# Patient Record
Sex: FEMALE | Race: WHITE
[De-identification: names, ages, dates, MRNs, and addresses within clinical notes are randomized per-mention and may not be internally consistent; named-entity substitution may affect disease eponyms.]

---

## 2019-03-21 ENCOUNTER — HOSPITAL ENCOUNTER (EMERGENCY)
Dept: HOSPITAL 88 - ER | Age: 84
Discharge: ANOTHER HOSPITAL | End: 2019-03-21
Payer: MEDICARE

## 2019-03-21 DIAGNOSIS — R09.89: Primary | ICD-10-CM

## 2019-03-21 NOTE — XMS REPORT
Summary of Care: 10/19/15 - 10/19/15

                             Created on: 2032



GIOBLANCA

External Reference #: [id number]

: 1931

Sex: Female



Demographics







                          Address                   [facility] [place]  [address]-

 

                          Home Phone                [phone]

 

                          Preferred Language        English

 

                          Marital Status            Single

 

                          [employer] Affiliation     [employer]

 

                          Race                      Other

 

                          Ethnic Group              Non-





Author







                          Author                    [facility]

 

                          Organization              [facility]

 

                          Address                   Unknown

 

                          Phone                     Unavailable







Encounter





HQ Gwen(FIN) [id number] Date(s): 10/19/15 - 10/19/15

[facility] [address] [place] [phone]
[phone]

Discharge Disposition: Home

Attending Physician: Lalo Nagy DO





Vital Signs





No data available for this section



Problem List







    



              Condition     Effective Dates     Status       Health Status     Informant

 

    



                           A-fib(Confirmed)          Resolved  

 

    



                           Anxiety(Confirmed)        Resolved  

 

    



                           Cardiac                   Resolved  



                                         pacemaker(Confirmed)    

 

    



                           CHF - Congestive          Resolved  



                                         heart    



                                         failure(Confirmed)    

 

    



                           Closed hip                Resolved  



                                         fracture(Confirmed)1    

 

    



                           CVA (cerebral             Resolved  



                                         vascular    



                                         accident)(Confirmed)    

 

    



                           Dementia(Confirmed)       Resolved  

 

    



                           Parkinson's               Resolved  



                                         disease(Confirmed)    







1bilateral



Allergies, Adverse Reactions, Alerts







   



                 Substance       Reaction        Severity        Status

 

   



                           morphine1                 Active







1Pt had a generalized rash on 10/7



Medications





No data available for this section



Results





No data available for this section



Immunizations







  



                     Vaccine             Date                Refusal Reason

 

  



                     influenza virus vaccine, inactivated     10/8/15             Patient Refuses

 

  



                     pneumococcal 23-valent vaccine     10/8/15             Patient Refuses







Procedures





No data available for this section



Social History







 



                           Social History Type       Response

 

 



                           Smoking Status            Never smoker; Exposure to Tobacco Smoke None; Cigarette Smoking

 Last 365



                                         Days No; Reg Smoking Cessation Counseling No







Assessment and Plan





No data available for this section

## 2019-03-21 NOTE — XMS REPORT
Summary of Care: 10/7/15 - 10/12/15

                             Created on: 2032



BLANCA BERNARDO

External Reference #: [id number]

: 1931

Sex: Female



Demographics







                          Address                   [address] [place]  [address]-

 

                          Home Phone                [phone]

 

                          Preferred Language        English

 

                          Marital Status            

 

                          Congregational Affiliation     Oriental orthodox

 

                          Race                      Other

 

                          Ethnic Group              Non-





Author







                          Author                    [facility]

 

                          Organization              [facility]

 

                          Address                   Unknown

 

                          Phone                     Unavailable







Encounter





WILFREDO Hidalgo(MANE) [id number] Date(s): 10/7/15 - 10/12/15

[facility] [address] [facility] provided by         
  [employer]       [place] [place] [address]-    
[phone]

Discharge Disposition: Skilled Nursing Facility

Attending Physician: Eleanor Encarnacion MD

Admitting Physician: Eleanor Encarnacion MD

Referring Physician: Zhao Alarcon MD





Vital Signs







                    1                   2                   3



                                         Most recent to   



                                         oldest [Reference   



                                         Range]:   

 

                                        152 cm 

                          (10/10/15 5:12 AM)        152.4 cm 

                          (10/7/15 3:34 PM)         152.4 cm 

                                        (10/7/15 3:09 PM)



                                         Height   









                    1                   2                   3



                                         Most recent to   



                                         oldest [Reference   



                                         Range]:   

 

                                        45 kg 

                          (10/10/15 5:12 AM)        45.2 kg 

                          (10/8/15 5:55 AM)          



                                         Current Weight   









                    1                   2                   3



                                         Most recent to   



                                         oldest [Reference   



                                         Range]:   

 

                                        98.2 DegF 

                          (10/12/15 11:53 AM)       97.8 DegF 

                          (10/12/15 11:48 AM)       97.6 DegF 

                                        (10/12/15 7:43 AM)



                                         Temperature Oral   



                                         [96.4-99.1 DegF]   









                    1                   2                   3



                                         Most recent to   



                                         oldest [Reference   



                                         Range]:   

 

                                        119/68 mmHg 

                          (10/12/15 11:51 AM)       130/75 mmHg 

                          (10/12/15 11:48 AM)       139/84 mmHg 

                                        (10/12/15 7:43 AM)



                                         Blood Pressure   



                                         [/60-90 mmHg]   









                    1                   2                   3



                                         Most recent to   



                                         oldest [Reference   



                                         Range]:   

 

                                        20 BRMIN 

                          (10/12/15 11:51 AM)       20 BRMIN 

                          (10/12/15 11:48 AM)       20 BRMIN 

                                        (10/12/15 7:43 AM)



                                         Respiratory Rate   



                                         [14-20 BRMIN]   









                    1                   2                   3



                                         Most recent to   



                                         oldest [Reference   



                                         Range]:   

 

                                        70 bpm 

                          (10/12/15 11:48 AM)       70 bpm 

                          (10/12/15 7:43 AM)        70 bpm 

                                        (10/12/15 3:27 AM)



                                         Peripheral Pulse   



                                         Rate [ bpm]   









                    1                   2                   3



                                         Most recent to   



                                         oldest [Reference   



                                         Range]:   

 

                                        43.182 kg 

                          (10/7/15 3:34 PM)         40.909 kg 

                          (10/7/15 3:09 PM)         60 kg 

                                        (10/7/15 10:48 AM)



                                         Weight   









                    1                   2                   3



                                         Most recent to   



                                         oldest [Reference   



                                         Range]:   

 

                                        18.59 m2 

                          (10/7/15 3:34 PM)         17.61 m2 

                          (10/7/15 3:09 PM)          



                                         Body Mass Index   







Problem List







    



              Condition     Effective Dates     Status       Health Status     Informant

 

    



                           A-fib(Confirmed)          Resolved  

 

    



                           Anxiety(Confirmed)        Resolved  

 

    



                           Cardiac                   Resolved  



                                         pacemaker(Confirmed)    

 

    



                           CHF - Congestive          Resolved  



                                         heart    



                                         failure(Confirmed)    

 

    



                           Closed hip                Resolved  



                                         fracture(Confirmed)1    

 

    



                           CVA (cerebral             Resolved  



                                         vascular    



                                         accident)(Confirmed)    

 

    



                           Dementia(Confirmed)       Resolved  

 

    



                           Parkinson's               Resolved  



                                         disease(Confirmed)    







1bilateral



Allergies, Adverse Reactions, Alerts







   



                 Substance       Reaction        Severity        Status

 

   



                           morphine1                 Active







1Pt had a generalized rash on 10/7



Medications





acetaminophen

650 mg, 2 tab, Route: PO, Drug form: TAB, Q6H, Dosing Weight 43.182, kg, Start d
ate: 10/08/15 12:00:00, Duration: 30 day, Stop date: 11/07/15 6:00:00

Notes: Do not exceed 4 gm/day.  (Same as: Tylenol)

Start Date: 10/8/15

Stop Date: 10/11/15

Status: Discontinued



Aldactone 25 mg oral tablet

25 mg=1 tab, PO, PRN, # 60 tab, 0 Refill(s)

Start Date: 10/7/15

Status: Ordered



ALPRAZOLam 0.25 mg oral tablet, disintegrating

0.25 mg=1 tab, PO, Q12H, PRN Anxiety, 0 Refill(s)

Start Date: 10/7/15

Status: Ordered



Benadryl

12.5 mg, 0.25 mL, Route: IV, Drug form: INJ, ONCE, Dosing Weight 60, kg, Start d
ate: 10/07/15 14:46:00, Stop date: 10/07/15 14:46:00

Notes: (Same as: Benadryl)

Start Date: 10/7/15

Stop Date: 10/7/15

Status: Completed



Benadryl

12.5 mg, 5 mL, Route: PO, Drug form: LIQ, TID, Dosing Weight 43.182, kg, PRN Itc
damon, Start date: 10/08/15 1:48:00, Duration: 30 day, Stop date: 11/07/15 1:47:0
0

Notes: (Same as: Benadryl)

Start Date: 10/8/15

Stop Date: 10/12/15

Status: Discontinued



carbidopa-levodopa 25 mg-100 mg oral tablet

1 tab, PO, QID, 0 Refill(s)

Start Date: 10/12/15

Status: Ordered



carbidopa-levodopa 25 mg-100 mg oral tablet

1 tab, Route: PO, Drug Form: TAB, Dosing Weight 60, kg, QID, Start date: 10/07/1
5 17:00:00, Duration: 30 day, Stop date: 11/06/15 13:00:00

Notes: Take with milk or food. (Same As: Sinemet)

Start Date: 10/7/15

Stop Date: 10/12/15

Status: Discontinued



carbidopa-levodopa 25 mg-100 mg oral tablet

1 tab, PO, QID, # 120 tab, 0 Refill(s)

Start Date: 10/7/15

Status: Ordered



Coreg 12.5 mg oral tablet

12.5 mg=1 tab, PO, BID, # 60 tab, 0 Refill(s)

Start Date: 10/12/15

Status: Ordered



Dextrose 50% Syringe

12.5 gm, 25 mL, Route: IVP, Drug Form: INJ, Dosing Weight 43.182, kg, PRN, PRN A
bnormal Lab Result, Start date: 10/09/15 3:58:00, Duration: 30 day, Stop date: 1
1/08/15 2:57:00, For [facility] 40 mg/dL - 60 mg/dL

Special Instructions: For [facility] 40 mg/dL - 60 mg/dL

Start Date: 10/9/15

Stop Date: 10/9/15

Status: Discontinued



Dextrose 50% Syringe

25 gm, 50 mL, Route: IVP, Drug Form: INJ, Dosing Weight 43.182, kg, PRN, PRN Abn
ormal Lab Result, Start date: 10/09/15 3:58:00, Duration: 30 day, Stop date: 11/
08/15 2:57:00, For [facility] < 40 mg/dL

Special Instructions: For [facility] < 40 mg/dL

Start Date: 10/9/15

Stop Date: 10/9/15

Status: Discontinued



docusate

100 mg, 1 cap, Route: NG, Drug form: CAP, Q12H, Dosing Weight 60, kg, Start date
: 10/07/15 21:00:00, Stop date: 11/06/15 9:00:00

Notes: (Same as: Colace) (Do Not Crush)

Start Date: 10/7/15

Stop Date: 10/12/15

Status: Discontinued



Enoxaparin 20 mg (0.2 mL)

Enoxaparin 20 mg (0.2 mL), 20 mg, 0.2 mL, Drug form: MISC, Route: SUB-Q, enoxQ12
H, 10/09/15 12:30:00, Duration: 30 day, Stop date: 11/08/15 0:30:00

Notes: PLEASE NOTE: Using enoxaparin 30 mg syringe, draw up 0.2 mL and put in in
jection syringe under IV de leon in sterile conditions.

Start Date: 10/9/15

Stop Date: 10/11/15

Status: Discontinued



Factor 2-7-9-10 Prothrombin Complex Concentrate 2,208 unit + IV bag 1 ea

Route: IV, Drug form: INJ, ONCE, Dosing Weight 60, kg, ; Use 100kg max dosing we
ight for pt >100kg. Max. cumulative dose=50 units/kg/day., Priority: STAT, Start
date: 10/07/15 11:16:00, Stop date: 10/07/15 11:16:00

Notes: Same as: Kcentra  Maximum dose=5000 units; Round down dose to the nearest
vial size  Hematology clinical pharmacist consult required

Start Date: 10/7/15

Stop Date: 10/7/15

Status: Completed



Factor 2-7-9-10 Prothrombin Complex Concentrate 2,208 unit + IV bag 1 ea

Route: IV, Drug form: INJ, ONCE, Dosing Weight 60, kg, ; Use 100kg max dosing we
ight for pt >100kg. Max. cumulative dose=50 units/kg/day., Priority: STAT, Start
date: 10/07/15 10:49:00, Stop date: 10/07/15 10:49:00

Notes: Same as: Kcentra  Maximum dose=5000 units; Round down dose to the nearest
vial size  Hematology clinical pharmacist consult required

Start Date: 10/7/15

Stop Date: 10/7/15

Status: Deleted



fosphenytoin

1,200 mg, Route: IVPB, ONCE, Dosing Weight 60, kg, Priority: STAT, Start date: 1
0/07/15 10:51:00, Stop date: 10/07/15 10:51:00

Start Date: 10/7/15

Stop Date: 10/7/15

Status: Completed



heparin

5,000 unit, 1 mL, Route: SUB-Q, Drug form: INJ, Q12H, Start date: 10/08/15 10:30
:00, Duration: 30 day, Stop date: 11/07/15 9:00:00

Notes: porcine heparin

Start Date: 10/8/15

Stop Date: 10/8/15

Status: Canceled



heparin 5000 units/mL injectable solution

5000, SUB-Q, Q12H, # 1 IntlUnit, 0 Refill(s)

Start Date: 10/12/15

Status: Ordered



heparin 5000 units/mL injectable solution

5,000 unit, 1 mL, Route: SUB-Q, Drug form: INJ, Q12H, Dosing Weight 43.182, kg, 
Start date: 10/11/15 12:00:00, Duration: 30 day, Stop date: 11/10/15 0:00:00

Notes: porcine heparin

Start Date: 10/11/15

Stop Date: 10/12/15

Status: Discontinued



influenza virus vaccine, inactivated

0.5 mL, Route: IM, Drug Form: SUSP, Daily, Start date: 10/08/15 9:00:00, Duratio
n: 1 doses or times, Stop date: 10/08/15 9:00:00

Notes: (Same as: Fluzone Quadrivalent)For 3 years of age and older (0.5 mL IM)Sh
tesfaye well before use

Start Date: 10/8/15

Stop Date: 10/8/15

Status: Completed



Insulin regular

12 unit, 0.12 mL, Route: SUB-Q, Drug form: SOLN, PRN, Dosing Weight 43.182, kg, 
PRN Abnormal Lab Result, Start date: 10/09/15 3:58:00, Duration: 30 day, Stop da
te: 11/08/15 2:57:00, For [facility] >=200 mg/dL

Special Instructions: For [facility] >=200 mg/dL

Notes: (Same as: Humulin R) Roll in palms of hands gently;  Do not shake vigorou
sly. "single patient use only"(Restricted to patients requiring a dose >  60 
units)  Stable for 28 days at room temperatureExpires in ______ days from _
_____________Date

Start Date: 10/9/15

Stop Date: 10/9/15

Status: Discontinued



Insulin regular

8 unit, 0.08 mL, Route: SUB-Q, Drug form: SOLN, PRN, Dosing Weight 43.182, kg, P
RN Abnormal Lab Result, Start date: 10/09/15 3:58:00, Duration: 30 day, Stop brandi
e: 11/08/15 2:57:00, For [facility] 175 mg/dL - 199 mg/dL

Special Instructions: For [facility] 175 mg/dL - 199 mg/dL

Notes: (Same as: Humulin R) Roll in palms of hands gently;  Do not shake vigorou
sly. "single patient use only"(Restricted to patients requiring a dose >  60 
units)  Stable for 28 days at room temperatureExpires in ______ days from _
_____________Date

Start Date: 10/9/15

Stop Date: 10/9/15

Status: Discontinued



Insulin regular

5 unit, 0.05 mL, Route: SUB-Q, Drug form: SOLN, PRN, Dosing Weight 43.182, kg, P
RN Abnormal Lab Result, Start date: 10/09/15 3:58:00, Duration: 30 day, Stop brandi
e: 11/08/15 2:57:00, For [facility] 150 mg/dL - 174 mg/dL

Special Instructions: For [facility] 150 mg/dL - 174 mg/dL

Notes: (Same as: Humulin R) Roll in palms of hands gently;  Do not shake vigorou
sly. "single patient use only"(Restricted to patients requiring a dose >  60 
units)  Stable for 28 days at room temperatureExpires in ______ days from _
_____________Date

Start Date: 10/9/15

Stop Date: 10/9/15

Status: Discontinued



Lasix 20 mg oral tablet

20 mg=1 tab, PO, Daily, # 30 tab, 0 Refill(s)

Start Date: 10/7/15

Status: Ordered



Lasix 20 mg oral tablet

20 mg, 1 tab, Route: PO, Drug form: TAB, Daily, Dosing Weight 60, kg, Start date
: 10/08/15 9:00:00, Duration: 30 day, Stop date: 11/06/15 9:00:00

Notes: (Same as: Lasix)  May cause GI upset.  Give with food or milk.

Start Date: 10/8/15

Stop Date: 10/12/15

Status: Discontinued



lisinopril

5 mg, 1 tab, Route: PO, Drug form: TAB, Daily, Dosing Weight 60, kg, Start date:
10/08/15 9:00:00, Duration: 30 day, Stop date: 11/06/15 9:00:00

Notes: (Same as: Prinivil, Zestril)

Start Date: 10/8/15

Stop Date: 10/12/15

Status: Discontinued



lisinopril 5 mg oral tablet

5 mg=1 tab, PO, Daily, # 30 tab, 0 Refill(s)

Start Date: 10/7/15

Stop Date: 10/12/15

Status: Discontinued



Lovenox

20 mg, 0.2 mL, Route: SUB-Q, Drug form: INJ, [id number], Dosing Weight 43.182, kg,
Start date: 10/08/15 10:30:00, Duration: 30 day, Stop date: 11/06/15 22:30:00

Notes: (Same as: Lovenox)

Start Date: 10/8/15

Stop Date: 10/9/15

Status: Discontinued



Lovenox

30 mg, Route: SUB-Q, Drug form: INJ, [id number], Dosing Weight 43.182, kg, Start d
ate: 10/08/15 10:30:00, Duration: 30 day, Stop date: 11/06/15 22:30:00

Start Date: 10/8/15

Stop Date: 10/8/15

Status: Canceled



Lyrica

25 mg, 1 cap, Route: PO, Drug form: CAP, Q8H, Dosing Weight 43.182, kg, Start da
te: 10/08/15 0:00:00, Duration: 30 day, Stop date: 11/06/15 16:00:00

Notes: (Same as: Lyrica)

Start Date: 10/8/15

Stop Date: 10/11/15

Status: Discontinued



magnesium sulfate

2 gm, 50 mL, Route: IVPB, Drug form: INJ, ONCE, Dosing Weight 43.182, kg, Total 
dose=2 gm, Start date: 10/08/15 0:56:00, Duration: 1 doses or times, Stop date: 
10/08/15 0:56:00

Start Date: 10/8/15

Stop Date: 10/8/15

Status: Completed



morphine Sulfate

4 mg, Route: IVP, Drug form: INJ, ONCE, kg, Priority: STAT, Start date: 10/07/15
10:08:00, Stop date: 10/07/15 10:08:00

Start Date: 10/7/15

Stop Date: 10/7/15

Status: Completed



Norco 10/325 oral tablet

1 tab, PO, Q6H, PRN for pain, 0 Refill(s)

Start Date: 10/7/15

Stop Date: 10/12/15

Status: Discontinued



Norco 7.5/325 oral tablet

1 tab, Route: PO, Drug Form: TAB, Dosing Weight 43.182, kg, Q6H, Start date: 10/
11/15 12:00:00, Duration: 30 day, Stop date: 11/10/15 6:00:00

Notes: Same as Norco 325-7.5mg  Do not exceed 4gm/day of acetaminophen.

Start Date: 10/11/15

Stop Date: 10/12/15

Status: Discontinued



NS (Bolus) IV

500 mL, 500 ml/hr, Infuse Over: 1 hr, Route: IV, 500, Drug form: INJ, ONCE, Prio
rity: STAT, Dosing Weight 43.182 kg, Start date: 10/08/15 15:09:00, Duration: 1 
doses or times, Stop date: 10/08/15 15:09:00

Start Date: 10/8/15

Stop Date: 10/8/15

Status: Completed



nystatin topical 100,000 units/g powder

1 appl, TOP, BID, # 15 gm, 0 Refill(s)

Start Date: 10/12/15

Stop Date: 10/16/15

Status: Ordered



nystatin topical 100,000 units/g powder

1 appl, Route: TOP, BID, Drug form: PWDR, Start date: 10/10/15 9:00:00, Duration
: 30 day, Stop date: 11/08/15 17:00:00

Notes: (Same as:Mycostatin, Nilstat)  For external use only.

Start Date: 10/10/15

Stop Date: 10/12/15

Status: Discontinued



Ofirmev

1,000 mg, 100 mL, Route: IV, Drug form: INJ, Q6H, Dosing Weight 43.182, kg, for 
> or=50 kg, Start date: 10/08/15 19:00:00, Duration: 30 day, Stop date: 11/07/15
18:00:00

Notes: Infuse over 15 minutesDo not exceed 4gm/day of acetaminophen  *** MEDICAT
ION WASTE ***Product Size:  1000 mgProduct Wasted:  ___ mg

Start Date: 10/8/15

Stop Date: 10/8/15

Status: Canceled



oxyCODONE 5 mg immediate release

5 mg, 1 tab, Route: PO, Drug form: TAB, Q6H, Dosing Weight 43.182, kg, PRN Pain 
Score 4-6, Start date: 10/08/15 9:40:00, Duration: 30 day, Stop date: 11/07/15 9
:39:00

Notes: (Same as: Roxicodone)

Start Date: 10/8/15

Stop Date: 10/11/15

Status: Discontinued



oxyCODONE 5 mg immediate release

5 mg, 1 tab, Route: PO, Drug form: TAB, Q6H, Dosing Weight 43.182, kg, Start brandi
e: 10/08/15 0:00:00, Duration: 30 day, Stop date: 11/06/15 18:00:00

Notes: (Same as: Roxicodone)

Start Date: 10/8/15

Stop Date: 10/8/15

Status: Discontinued



phenytoin

100 mg, 1 cap, Route: PO, Drug form: ERCAP, Q12H, Dosing Weight 43.182, kg, Star
t date: 10/08/15 21:00:00, Duration: 6 day, Stop date: 10/14/15 9:00:00

Notes: (Same as: Dilantin)  Do not open, crush, or chew.

Start Date: 10/8/15

Stop Date: 10/12/15

Status: Discontinued



phenytoin

100 mg, 2 mL, Route: IV, Drug form: INJ, Q8H, Dosing Weight 60, kg, Start date: 
10/07/15 19:00:00, Duration: 30 day, Stop date: 11/06/15 16:00:00

Notes: (Same as: Dilantin) Do not infuse greater than 50 mg/min.  *** MEDICATION
WASTE ***Product Size:  100 mgProduct Wasted:  ___ mg

Start Date: 10/7/15

Stop Date: 10/8/15

Status: Discontinued



phenytoin 100 mg oral capsule, extended release

100 mg=1 cap, PO, Q12H, PRN Pain Score 6-10, # 30 cap, 0 Refill(s)

Start Date: 10/12/15

Stop Date: 10/25/15

Status: Ordered



PlasmaLyte A PH-7.4 (Bolus) IV

500 mL, 500 ml/hr, Route: IV, Drug Form: INJ, Dosing Weight 43.182, kg, ONCE, St
art date: 10/07/15 20:40:00, Stop date: 10/07/15 20:40:00

Start Date: 10/7/15

Stop Date: 10/7/15

Status: Completed



pneumococcal 23-valent vaccine

0.5 mL, Route: IM, Drug Form: INJ, Daily, Start date: 10/08/15 9:00:00, Duration
: 1 doses or times, Stop date: 10/08/15 9:00:00

Notes: (Same as: Pneumovax 23)  Refrigerate

Start Date: 10/8/15

Stop Date: 10/8/15

Status: Completed



potassium chloride 20 mEq oral tablet, extended release

20 mEq=1 tab, PO, Daily, # 30 tab, 3 Refill(s)

Start Date: 10/7/15

Status: Ordered



pramipexole

0.25 mg, 1 tab, Route: PO, Drug form: TAB, TID, Dosing Weight 60, kg, Start date
: 10/07/15 17:00:00, Duration: 30 day, Stop date: 11/06/15 13:00:00

Notes: (Same as: Mirapex)

Start Date: 10/7/15

Stop Date: 10/12/15

Status: Discontinued



pramipexole 0.25 mg oral tablet

0.25 mg=1 tab, PO, TID, # 90 tab, 0 Refill(s)

Start Date: 10/7/15

Status: Ordered



Senna 187mg

1 tab, Route: PO, Dosing Weight 43.182, kg, BID, Start date: 10/08/15 17:00:00, 
Duration: 30 day, Stop date: 11/07/15 9:00:00

Start Date: 10/8/15

Stop Date: 10/8/15

Status: Discontinued



senna 8.6 mg oral tablet

17.2 mg=2 tab, PO, Q12H, # 12 tab, 0 Refill(s)

Start Date: 10/12/15

Stop Date: 10/18/15

Status: Ordered



Senokot

17.2 mg, 2 tab, Route: PO, Drug form: TAB, Q12H, Start date: 10/08/15 21:00:00, 
Duration: 30 day, Stop date: 11/07/15 9:00:00

Notes: (Same as: Senokot)

Start Date: 10/8/15

Stop Date: 10/12/15

Status: Discontinued



Synthroid

112 microgram, 1 tab, Route: PO, Drug form: TAB, Q630AM, Dosing Weight 60, kg, S
tart date: 10/08/15 6:30:00, Duration: 30 day, Stop date: 11/06/15 6:30:00

Notes: Take 1 hour before or 2 hours after meal; Enteral feeds may interefere wi
th the absorption of this medication.(Same as:Levothroid)

Start Date: 10/8/15

Stop Date: 10/12/15

Status: Discontinued



Synthroid 112 mcg (0.112 mg) oral tablet

112 microgram=1 tab, PO, Daily, # 30 tab, 0 Refill(s)

Start Date: 10/7/15

Status: Ordered



tramadol

50 mg, 1 tab, Route: PO, Drug form: TAB, Q6H, Dosing Weight 43.182, kg, Start da
te: 10/08/15 0:00:00, Duration: 30 day, Stop date: 11/06/15 18:00:00

Notes: Not to exceed 400mg/day. (Same As: Ultram)

Start Date: 10/8/15

Stop Date: 10/12/15

Status: Discontinued



tramadol 100 mg oral tablet, extended release

100 mg=1 tab, PO, Q8H, PRN Pain Score 6-10, # 24 tab, 0 Refill(s)

Start Date: 10/12/15

Status: Ordered



tramadol 100 mg oral tablet, extended release

100 mg=1 tab, PO, ABXQ8H, PRN Pain Score 6-10, # 24 tab, 0 Refill(s)

Start Date: 10/12/15

Stop Date: 10/12/15

Status: Discontinued



Tylenol

650 mg, 2 tab, Route: PO, Drug form: TAB, Q6H, Dosing Weight 43.182, kg, PRN For
Temp > 100.4 F, Start date: 10/07/15 16:27:00, Duration: 30 day, Stop date: 
11/06/15 16:26:00

Notes: Do not exceed 4 gm/day.  (Same as: Tylenol)

Start Date: 10/7/15

Stop Date: 10/7/15

Status: Discontinued



Visipaque 320mg/ml

90 mL, Route: IVP, Drug Form: SOLN, kg, ONCALL, STAT, Start date: 10/07/15 10:03
:00, Duration: 1 doses or times, Dose=2.2ml/kg,  Max dose=100ml -- "To be infuse
d by Radiology Staff ONLY"

Special Instructions: Dose=2.2ml/kg,  Max dose=100ml -- "To be infused by Radiol
ogy Staff ONLY"

Start Date: 10/7/15

Stop Date: 10/7/15

Status: Completed



Xarelto 15 mg oral tablet

15 mg=1 tab, PO, Daily, 3 Refill(s)

Start Date: 10/7/15

Stop Date: 10/12/15

Status: Discontinued



Zofran

4 mg, Route: IVP, Drug form: INJ, ONCE, kg, Priority: STAT, Start date: 10/07/15
10:08:00, Stop date: 10/07/15 10:08:00

Start Date: 10/7/15

Stop Date: 10/7/15

Status: Completed



Results





ELECTROLYTES





                    1                   2                   3



                                         Most recent to   



                                         oldest [Reference   



                                         Range]:   

 

                                        136 mEq/L 

                          (10/9/15 3:44 AM)         143 mEq/L 

                          (10/8/15 12:15 AM)        140 mEq/L 

                                        (10/7/15 8:34 PM) 



                                         Sodium Lvl [135-145   



                                         mEq/L]   

 

                                        3.7 mEq/L 

                          (10/9/15 3:44 AM)         3.7 mEq/L 

                          (10/8/15 12:15 AM)        3.4 mEq/L 

*LOW*

                                        (10/7/15 8:34 PM) 



                                         Potassium Lvl   



                                         [3.5-5.1 mEq/L]   

 

                                        99 mEq/L 

                          (10/9/15 3:44 AM)         104 mEq/L 

                          (10/8/15 12:15 AM)        102 mEq/L 

                                        (10/7/15 8:34 PM) 



                                         Chloride Lvl [   



                                         mEq/L]   

 

                                        29 mEq/L 

                          (10/9/15 3:44 AM)         31 mEq/L 

                          (10/8/15 12:15 AM)        30 mEq/L 

                                        (10/7/15 8:34 PM) 



                                         CO2 [24-32 mEq/L]   

 

                                        11.7 mEq/L 

                          (10/9/15 3:44 AM)         11.7 mEq/L 

                          (10/8/15 12:15 AM)        11.4 mEq/L 

                                        (10/7/15 8:34 PM) 



                                         AGAP [10.0-20.0   



                                         mEq/L]   







CHEM PANEL





                    1                   2                   3



                                         Most recent to   



                                         oldest [Reference   



                                         Range]:   

 

                                        0.9 mg/dL 

                          (10/9/15 3:44 AM)         1.1 mg/dL 

                          (10/8/15 12:15 AM)        1.0 mg/dL 

                                        (10/7/15 8:34 PM) 



                                         Creatinine Lvl   



                                         [0.5-1.4 mg/dL]   

 

                                        59 mL/min/1.73m2 1

*NA*

                          (10/9/15 3:44 AM)         46 mL/min/1.73m2 2

*NA*

                          (10/8/15 12:15 AM)        54 mL/min/1.73m2 3

*NA*

                                        (10/7/15 8:34 PM) 



                                         eGFR   

 

                                        19 mg/dL 

                          (10/9/15 3:44 AM)         14 mg/dL 

                          (10/8/15 12:15 AM)        12 mg/dL 

                                        (10/7/15 8:34 PM) 



                                         BUN [7-22 mg/dL]   

 

                                        99 mg/dL 

                          (10/9/15 3:44 AM)         130 mg/dL 

*HI*

                          (10/8/15 12:15 AM)        141 mg/dL 

*HI*

                                        (10/7/15 8:34 PM) 



                                         Glucose Lvl [70-99   



                                         mg/dL]   

 

                                        8.6 mg/dL 

                          (10/9/15 3:44 AM)         8.8 mg/dL 

                          (10/8/15 12:15 AM)        9.1 mg/dL 

                                        (10/7/15 8:34 PM) 



                                         Calcium Lvl   



                                         [8.5-10.5 mg/dL]   

 

                                        2.2 mg/dL 

*LOW*

                          (10/9/15 3:44 AM)         4.1 mg/dL 

                          (10/8/15 12:15 AM)        3.5 mg/dL 

                                        (10/7/15 8:34 PM) 



                                         Phosphorus [2.5-4.5   



                                         mg/dL]   

 

                                        2.2 mg/dL 

                          (10/9/15 3:44 AM)         1.8 mg/dL 

                          (10/8/15 12:15 AM)        1.6 mg/dL 

*LOW*

                                        (10/7/15 8:34 PM) 



                                         Magnesium Lvl   



                                         [1.8-2.4 mg/dL]   







1Result Comment: The eGFR is calculated using the CKD-EPI formula. In most 
young, healthy individuals the eGFR will be >90 mL/min/1.73m2. The eGFR declines
with age. An eGFR of 60-89 may be normal in some populations, particularly the 
elderly, for whom the CKD-EPI formula has not been extensively validated. Use of
the eGFR is not recommended in the following populations:



Individuals with unstable creatinine concentrations, including pregnant patients
and those with serious co-morbid conditions.



Patients with extremes in muscle mass or diet. 



The data above are obtained from the National Kidney Disease Education Program (
NKDEP) which additionally recommends that when the eGFR is used in patients with
extremes of body mass index for purposes of drug dosing, the eGFR should be mul
tiplied by the estimated BMI.



2Result Comment: The eGFR is calculated using the CKD-EPI formula. In most 
young, healthy individuals the eGFR will be >90 mL/min/1.73m2. The eGFR declines
with age. An eGFR of 60-89 may be normal in some populations, particularly the 
elderly, for whom the CKD-EPI formula has not been extensively validated. Use of
the eGFR is not recommended in the following populations:



Individuals with unstable creatinine concentrations, including pregnant patients
and those with serious co-morbid conditions.



Patients with extremes in muscle mass or diet. 



The data above are obtained from the National Kid[employer] Disease Education Program (
NKDEP) which additionally recommends that when the eGFR is used in patients with
extremes of body mass index for purposes of drug dosing, the eGFR should be mul
tiplied by the estimated BMI.



3Result Comment: The eGFR is calculated using the CKD-EPI formula. In most 
young, healthy individuals the eGFR will be >90 mL/min/1.73m2. The eGFR declines
with age. An eGFR of 60-89 may be normal in some populations, particularly the 
elderly, for whom the CKD-EPI formula has not been extensively validated. Use of
the eGFR is not recommended in the following populations:



Individuals with unstable creatinine concentrations, including pregnant patients
and those with serious co-morbid conditions.



Patients with extremes in muscle mass or diet. 



The data above are obtained from the National Kid[employer] Disease Education Program (
NKDEP) which additionally recommends that when the eGFR is used in patients with
extremes of body mass index for purposes of drug dosing, the eGFR should be mul
tiplied by the estimated BMI.



PARATHYROID PROFILE





                    1                   2                   3



                                         Most recent to   



                                         oldest [Reference   



                                         Range]:   

 

                                        1.08 mMol/L 

                          (10/9/15 3:44 AM)         1.08 mMol/L 

                          (10/8/15 12:15 AM)        1.06 mMol/L 

                                        (10/7/15 9:06 PM) 



                                         Ca Ion WB [1.05-1.25   



                                         mMol/L]   

 

                                        1.12 mMol/L 

                          (10/9/15 3:44 AM)         1.06 mMol/L 

                          (10/8/15 12:15 AM)        1.04 mMol/L 

*LOW*

                                        (10/7/15 9:06 PM) 



                                         Ca Norm WB   



                                         [1.05-1.25 mMol/L]   







URINE AND STOOL





                    1                   2                   3



                                         Most recent to   



                                         oldest [Reference   



                                         Range]:   

 

                                        Clear 

                    (10/7/15 8:34 PM)                          



                                         UA Turbidity [Clear]   

 

                                        Yellow 

*NA*

                    (10/7/15 8:34 PM)                          



                                         UA Color [Yellow]   

 

                                        6.5 

                    (10/7/15 8:34 PM)                          



                                         UA pH [5.0-8.0]   

 

                                        >=1.050 

*ABN*

                    (10/7/15 8:34 PM)                          



                                         UA Spec Grav   



                                         [<=1.030]   

 

                                        Negative mg/dL 

*NA*

                    (10/7/15 8:34 PM)                          



                                         UA Glucose [Negative   



                                         mg/dL]   

 

                                        Trace 

*ABN*

                    (10/7/15 8:34 PM)                          



                                         UA Blood [Negative]   

 

                                        10 mg/dL 

*ABN*

                    (10/7/15 8:34 PM)                          



                                         UA Ketones [Negative   



                                         mg/dL]   

 

                                        100 mg/dL 

*ABN*

                    (10/7/15 8:34 PM)                          



                                         UA Protein [Negative   



                                         mg/dL]   

 

                                        2.0 mg/dL 

*HI*

                    (10/7/15 8:34 PM)                          



                                         UA Urobilinogen   



                                         [0.1-1.0 mg/dL]   

 

                                        Negative 

*NA*

                    (10/7/15 8:34 PM)                          



                                         UA Bili [Negative]   

 

                                        Negative 

                    (10/7/15 8:34 PM)                          



                                         UA Leuk Est   



                                         [Negative]   

 

                                        Negative 

                    (10/7/15 8:34 PM)                          



                                         UA Nitrite   



                                         [Negative]   

 

                                        5 /HPF 

                    (10/7/15 8:34 PM)                          



                                         UA WBC [0-5 /HPF]   

 

                                        9 /HPF 

*HI*

                    (10/7/15 8:34 PM)                          



                                         UA RBC [0-2 /HPF]   

 

                                        Few /HPF 

*NA*

                    (10/7/15 8:34 PM)                          



                                         UA Bacteria [None   



                                         Seen /HPF]   

 

                                        Occasional /LPF 

*NA*

                    (10/7/15 8:34 PM)                          



                                         UA Sq Epi [Few /LPF]   

 

                                        Few /LPF 

*NA*

                    (10/7/15 8:34 PM)                          



                                         UA Mucus [None Seen   



                                         /LPF]   

 

                                        Occasional /HPF 

*ABN*

                    (10/7/15 8:34 PM)                          



                                         UA [place] Yeast [None   



                                         Seen /HPF]   

 

                                        Performed 

*NA*

                    (10/7/15 8:34 PM)                          



                                         Micro?   







HEMATOLOGY





                    1                   2                   3



                                         Most recent to   



                                         oldest [Reference   



                                         Range]:   

 

                                        8.6 K/CMM 

                          (10/9/15 3:44 AM)         14.5 K/CMM 

*HI*

                          (10/8/15 12:15 AM)        18.2 K/CMM 

*HI*

                                        (10/7/15 8:34 PM) 



                                         WBC [3.7-10.4 K/CMM]   

 

                                        4.46 M/CMM 

                          (10/9/15 3:44 AM)         5.22 M/CMM 

                          (10/8/15 12:15 AM)        5.33 M/CMM 

                                        (10/7/15 8:34 PM) 



                                         RBC [4.20-5.40   



                                         M/CMM]   

 

                                        11.5 g/dL 

*LOW*

                          (10/9/15 3:44 AM)         13.3 g/dL 

                          (10/8/15 12:15 AM)        13.7 g/dL 

                                        (10/7/15 8:34 PM) 



                                         Hgb [12.0-16.0 g/dL]   

 

                                        35.7 % 

*LOW*

                          (10/9/15 3:44 AM)         42.2 % 

                          (10/8/15 12:15 AM)        42.8 % 

                                        (10/7/15 8:34 PM) 



                                         Hct [36.0-48.0 %]   

 

                                        79.9 fL 

*LOW*

                          (10/9/15 3:44 AM)         80.8 fL 

                          (10/8/15 12:15 AM)        80.4 fL 

                                        (10/7/15 8:34 PM) 



                                         MCV [80.0-98.0 fL]   

 

                                        25.7 pg 

*LOW*

                          (10/9/15 3:44 AM)         25.4 pg 

*LOW*

                          (10/8/15 12:15 AM)        25.7 pg 

*LOW*

                                        (10/7/15 8:34 PM) 



                                         MCH [27.0-31.0 pg]   

 

                                        32.1 g/dL 

                          (10/9/15 3:44 AM)         31.5 g/dL 

*LOW*

                          (10/8/15 12:15 AM)        31.9 g/dL 4

*LOW*

                                        (10/7/15 8:34 PM) 



                                         MCHC [32.0-36.0   



                                         g/dL]   

 

                                        15.5 % 

*HI*

                          (10/9/15 3:44 AM)         15.4 % 

*HI*

                          (10/8/15 12:15 AM)        15.2 % 

*HI*

                                        (10/7/15 8:34 PM) 



                                         RDW [11.5-14.5 %]   

 

                                        193 K/CMM 

                          (10/9/15 3:44 AM)         200 K/CMM 

                          (10/8/15 12:15 AM)        219 K/CMM 

                                        (10/7/15 8:34 PM) 



                                         Platelet [133-450   



                                         K/CMM]   

 

                                        9.1 fL 

                          (10/9/15 3:44 AM)         9.3 fL 

                          (10/8/15 12:15 AM)        9.3 fL 

                                        (10/7/15 8:34 PM) 



                                         MPV [7.4-10.4 fL]   

 

                                        68.1 % 

                          (10/9/15 3:44 AM)         94.5 % 

*HI*

                          (10/8/15 12:15 AM)        96.2 % 

*HI*

                                        (10/7/15 8:34 PM) 



                                         Segs [45.0-75.0 %]   

 

                                        10.6 % 

*LOW*

                          (10/9/15 3:44 AM)         1.4 % 

*LOW*

                          (10/8/15 12:15 AM)        0.7 % 

*LOW*

                                        (10/7/15 8:34 PM) 



                                         Lymphocytes   



                                         [20.0-40.0 %]   

 

                                        10.3 % 

                          (10/9/15 3:44 AM)         3.3 % 

                          (10/8/15 12:15 AM)        2.6 % 

                                        (10/7/15 8:34 PM) 



                                         Monocytes [2.0-12.0   



                                         %]   

 

                                        10.8 % 

*HI*

                          (10/9/15 3:44 AM)         0.6 % 

                          (10/8/15 12:15 AM)        0.1 % 

                                        (10/7/15 8:34 PM) 



                                         Eosinophils [0.0-4.0   



                                         %]   

 

                                        0.2 % 

                          (10/9/15 3:44 AM)         0.2 % 

                          (10/8/15 12:15 AM)        0.4 % 

                                        (10/7/15 8:34 PM) 



                                         Basophils [0.0-1.0   



                                         %]   

 

                                        5.7 K/CMM 

                          (10/9/15 3:44 AM)         13.7 K/CMM 

*HI*

                          (10/8/15 12:15 AM)        17.5 K/CMM 

*HI*

                                        (10/7/15 8:34 PM) 



                                         Segs-Bands #   



                                         [1.5-8.1 K/CMM]   

 

                                        0.9 K/CMM 

*LOW*

                          (10/9/15 3:44 AM)         0.2 K/CMM 

*LOW*

                          (10/8/15 12:15 AM)        0.1 K/CMM 

*LOW*

                                        (10/7/15 8:34 PM) 



                                         Lymphocytes #   



                                         [1.0-5.5 K/CMM]   

 

                                        0.9 K/CMM 

*HI*

                          (10/9/15 3:44 AM)         0.5 K/CMM 

                          (10/8/15 12:15 AM)        0.5 K/CMM 

                                        (10/7/15 8:34 PM) 



                                         Monocytes # [0.0-0.8   



                                         K/CMM]   

 

                                        0.9 K/CMM 

*HI*

                          (10/9/15 3:44 AM)         0.1 K/CMM 

                          (10/8/15 12:15 AM)         



                                         Eosinophils #   



                                         [0.0-0.5 K/CMM]   

 

                                        0.1 K/CMM 

                    (10/7/15 8:34 PM)                          



                                         Basophils # [0.0-0.2   



                                         K/CMM]   

 

                                        1+ 

*ABN*

                    (10/9/15 3:44 AM)                          



                                         Microcyte [None   



                                         Seen]   

 

                                        16.1 seconds 

*HI*

                          (10/8/15 6:08 AM)         19.3 seconds 

*HI*

                          (10/7/15 10:52 AM)         



                                         PT [12.0-14.7   



                                         seconds]   

 

                                        1.26 

*HI*

                          (10/8/15 6:08 AM)         1.59 

*HI*

                          (10/7/15 10:52 AM)         



                                         INR [0.85-1.17]   

 

                                        33.8 seconds 

                          (10/8/15 6:08 AM)         32.0 seconds 

                          (10/7/15 10:52 AM)         



                                         PTT [22.9-35.8   



                                         seconds]   

 

                                        Citrated Whole Blood 

                    (10/7/15 9:53 AM)                          



                                         Rapid TEG Sample   



                                         Type   

 

                                        121 seconds 

*HI*

                    (10/7/15 9:53 AM)                          



                                         ACT (TEG) [   



                                         seconds]   

 

                                        0.7 minutes 

*NA*

                    (10/7/15 9:53 AM)                          



                                         [place]   

 

                                        0.8 minutes 

*HI*

                    (10/7/15 9:53 AM)                          



                                         R-time [0.4-0.7   



                                         minutes]   

 

                                        0.8 minutes 

                    (10/7/15 9:53 AM)                          



                                         K-time [0.6-2.3   



                                         minutes]   

 

                                        79 degrees 

                    (10/7/15 9:53 AM)                          



                                         Angle [64-80   



                                         degrees]   

 

                                        78 mm 

*HI*

                    (10/7/15 9:53 AM)                          



                                         Max Amp [52-71 mm]   

 

                                        17.5 K d/sc 

*HI*

                    (10/7/15 9:53 AM)                          



                                         G-value [5.0-11.6 K   



                                         d/sc]   

 

                                        3.4 % 5

                    (10/7/15 9:53 AM)                          



                                         Estimated % Lysis   



                                         [0.0-7.5 %]   







4Result Comment: rechecked.



5Result Comment: "Significant Findings called to mendy [email or web address]_at 10/07/2015 
10:59__bypm ___.Read Back OK."



Immunizations







  



                     Vaccine             Date                Refusal Reason

 

  



                     influenza virus vaccine, inactivated     10/8/15             Patient Refuses

 

  



                     pneumococcal 23-valent vaccine     10/8/15             Patient Refuses







Procedures





No data available for this section



Social History







 



                           Social History Type       Response

 

 



                           Smoking Status            Never smoker; Exposure to Tobacco Smoke None; Cigarette Smoking

 Last 365



                                         Days No; Reg Smoking Cessation Counseling No







Assessment and Plan

Extracted from:





  



                     Title: Clinical Document     Author: Larissa Yepez NP     Date: 10/12/15









                                        Date of Admission: 10/7/15



Date of Discharge:  10/12/15



Admitting Attending: Dr Encarnacion



Admission diagnosis:

Fall from standing

SDH-RT Frontoparietal SDH

Rib Fracture

Clavicle Fracture

Hand Fracture



Discharge Diagnoses:

Fall from standing

SDH-RT Frontoparietal SDH

Rib Fracture

Clavicle Fracture

Hand Fracture





In House Consultations:

ORS,ORS(hand), NSGY,Cardiology,Neuro Spine

Surgeries and Procedures:

No surgeries.RT hand splinted with dorsal volar flap





History and hospital course: 84 year old female w/ extensive PMH as shown below 
presents as Level 2 consult, after transfer from [facility], where she 
was taken by her son after a fall from standing at 1 am on the day of 
presentation; she did hit her head and was without loss of consciousness. Her 
baseline mental faculties are good, and she remained fully oriented after the 
accident. Imaging performed at [facility] revealed 2 mm R frontoparietal SDH, 
and acute rib 1 fractures. She did not receive tomography of her chest abdomen 
pelvis at the OSH.

The remainder of his hospital course was without complications.  Repeated labs 
remained stable. The patient was tolerating an oral diet, pain was controlled 
with oral pain medications, voiding w/o difficulty, and passing regular bowel 
movements.  She  was ambulating with assist from PT and RW and above goal on his
IS.  At this time all consulting services agreed pt to dc SNF.  She was 
instructed to notify all treating physicians if he develops fever, shortness of 
breath, pain that is not controlled on prescription pain medications, severe or 
worsening headache, numbness or tingling in her extremity

Pt was seen by the  Cardiology due to pts h/o Afib and was on xerelto, which was
held due to SDH -

RECOMMENDATIONS:

                                        - Can consider starting Coreg if BP tolerates. Can continue lasix and hold lisinopril

 if needed.

                                        - Start aspirin 325 mg daily- Have patient follow-up with outpatie





Disposition: SNF



Condition: stable



Diet: regular



Discharge medications:

Please see home medication reconciliation form



Activity: NWB RUE



Special Instructions:.Ineffective IS-  250/750 goal- CXR without e/o infiltrates
or effusions, poor IS likely 2/2 poor effort.encouraged IS, trained pt and 
family members at bedside on use and stressed importance to pt's recovery.

                                        .Pts blood pressure stable started on Coreg as per cardiology recommendation and

 held lisinopril.Pt also to continue aspirin 325 mgs po daily.On heparin SQ 
Q12hrs for DVT prophylaxia.Pts repeat head CT was stable.





Follow-ups:

                                        1.ORS-  follow up with Dr. Monroy on Oct 22, 2015. Call [phone] for appointment.





                                        2. Cardiology -RECOMMENDATIONS:

                                        - Can consider starting Coreg if BP tolerates. Can continue lasix and hold lisinopril

 if needed.

                                        - Start aspirin 325 mg daily

                                        - Have patient follow-up with outpatient Cardiologist to further discuss risk/benefits

 of anticoagulation vs bleeding.

                                        3.ORS Hand- Weight bearing status: NWB RUE- Follow up with Dr. Nagy 1 week

 after discharge

                                        4.Trauma- The patient should follow up in neurotrauma clinic in two weeks with a

 head CT ([phone]).





Larissa Yepez [facility]

                                        [id number]





Extracted from:





  



                     Title: Clinical Document     Author: Larissa Yepez NP     Date: 10/12/15









                                        Trauma Surgery Floor Progress Note: 

Today's Date:  10/12/15  Hospital Day # 6

Chief Complaint: " RT  hand/back pain.I am ready to go to SNF "

Overnight Events: No acute events

Brief History of Admission: 84 year old female w/ extensive PMH as shown below 
presents as Level 2 consult, after transfer from [facility], where she 
was taken by her son after a fall from standing at 1 am on the day of 
presentation; she did hit her head and was without loss of consciousness. Her 
baseline mental faculties are good, and she remained fully oriented after the 
accident. Imaging performed at [facility] revealed 2 mm R frontoparietal SDH, 
and acute rib 1 fractures. She did not receive tomography of her chest abdomen 
pelvis at the OSH.



Tertiary: Tertiary exam completed by Dr Aniceto Hernandez on 10/11

In Hospital Operations: None

Daily Events:

 10-/7  sticu- Repeat CT head, ORS hand consult, pain management



                                        10/8: transfered to floor from STICU, found to be hypotensive to SBP 80s, received

 500cc NS bolus

                                        10/12- transfer to SNF.



Physical Examination/Findings:

                                        24 Hr Vital Signs: BP: 129-148 / 66-76  HR: 70 RR: 18-20  O2:94-97%

Tmax: 97.9 Tcurrent: 98.2

Pain:4. Location: Rt hand/ lower back.

Constitutional/Neuro/Psych:

GCS: Eye 4 Verbal 5 Motor: 6 Total: 15

Sensation: gross sensory intact

Judgement: appropriate

Orientation: AAOX3

Memory/mood: WNL

Medications: 10/11/15 acetaminophen-hydrocodone (Norco 7.5/325 oral tablet) 1 
tab PO Q6H

                                        10/07/15 carbidopa-levodopa (carbidopa-levodopa 25 mg-100 mg oral tablet) 1 tab 

PO QID

                                        10/08/15 phenytoin 100 mg PO Q12H

                                        10/07/15 pramipexole 0.25 mg PO TID

                                        10/08/15 tramadol 50 mg PO Q6H

PRN-

                                        10/08/15 diphenhydrAMINE (Benadryl) 12.5 mg PO TID X0



HEENT:

Eyes: EOMs intact, no foreign bodies

Conjuctiva and Eye lids: clear, intact.

Pupils: round and equal

Ears and Nose: Gross hearing intact; nose non-tender, nares patent

Lips and Teeth: No lesions, dentition intact

Neck: supple, non-tender

meds-10/08/15 levothyroxine (Synthroid) 112 microgram PO Q630AM

Cardiovascular: 

Cardiac examination: Regular rate and rhythm

Extremity Edema: No extremity edema

Pulse exam: LUE 2+ RUE 2+

LLE 2+ RLE 2+

Medications:10/08/15 furosemide (Lasix 20 mg oral tablet) 20 mg PO Daily

                                        10/08/15 lisinopril 5 mg PO Daily

Pulmonary:

CXR: none today.

Chest examination: Lungs CTAB, unlabored breathing, chest -mild tenderness to rt
uper chest/shoulder

IS: 250/750 goal- CXR without e/o infiltrates or effusions, poor IS likely 2/2 
poor effort

Medications:

GI/Nutrition: 

Abdominal exam: No tenderness, masses, or hernias; + Bowel sounds, + flatus

Last BM: 0x24 hrs

Type of Diet: Oral, Regular

Medications:10/07/15 docusate 100 mg NG Q12H

                                        10/08/15 senna (Senokot) 17.2 mg PO Q12H

Genitourinary:

no labs:

Female external genitalia: WNL, voiding

                                        24 Hour Ins/Outs: 750/ x7 count

                                        24 Hour Urine Output: x7 count

Infectious Disease/Hematology: 

Tmax: 97.9

no labs

Antibiotics: none

Antifungal- 10/10/15 nystatin topical (nystatin topical 100,000 units/g powder) 
1 appl TOP BID_

DVT prophylaxis: 10/11/15 heparin (heparin 5000 units/mL injectable solution) 
5,000 unit SUB-Q Q12H

Endocrine: 

Glucose range: 

                                        24 Hour Insulin requirements: none

Musculoskeletal/Skin:

Activity: OOB w/ assistance

Weightbearing status: NWB RUE

Skin/wound examination: no abrasions noted, skin warm and dry

Extremity examination: 2+ distal pulses, RUE unable to examine pulse, fingers 
moving minimally limited by pain and dressings



Disposition:SNF

PT/OT Plan: will continue inpatient therapy



SW Plan:  paperwork sent to [facility] (facility from which pt 
presented) on Friday 10/9, expect response Monday 10/12



CM Plan: Discharge To :   Skilled nursing unit

Follow Ups:

                                        1.ORS-  follow up with Dr. Monroy on Oct 22, 2015. Call [phone] for appointment.





                                        2. Cardiology -RECOMMENDATIONS:

                                        - Can consider starting Coreg if BP tolerates. Can continue lasix and hold lisinopril

 if needed.

                                        - Start aspirin 325 mg daily

                                        - Have patient follow-up with outpatient Cardiologist to further discuss risk/benefits

 of anticoagulation vs bleeding.

                                        3.ORS Hand- Weight bearing status: NWB RUE- Follow up with Dr. Nagy 1 week

 after discharge

                                        4.Trauma- The patient should follow up in neurotrauma clinic in two weeks with a

 head CT ([phone]).

 Assessment and Plan:

                                        84 year old F status post fall from standing.  Injuries and plan as follows:



Injuries: Consults/Plans:

                                        1. R frontoparietal SDH1. repeat CT head stable, okay to begin anticoagulation per

 NSGY

                                        2. R Metacarpal 3 fx2. Right hand splinted with dorsal/volar slab in intrinsic plus,

 f/u final ORS surgical plan

                                        3. R distal clavicle fx3. Right arm in sling for comfort, f/u final ORS surgical

 plan

                                        4. Rib fx: R post 1-3, R ant rib 4, L rib 2,3,5          4. VEP, IS, MMP

                                        5.PMH of Afib



Additionally...

                                        1. Acute trauma pain- on po tramadol and Norco.c/o pain lower back-instructed to

 get OOB as tolerated with restrictions.

                                        2. Acute blood loss anemia- stable now.

                                        3.DVT prophylaxis- change lovenox to subq heparin per pharmacy recs.Contacted SNF

 and spoke to start pt on Aspirin 325 mgs daaily.

                                        4.Ineffective IS-  250/750 goal- CXR without e/o infiltrates or effusions, poor 

IS likely 2/2 poor effort.encouraged IS, trained pt and family members at 
bedside on use and stressed importance to pt's recovery.

                                        5.Pts blood pressure stable started on Coreg as per cardiology recommendation and

 held lisinopril



Larissa Yepez [facility]

                                        [id number]









 



                           Addendum                  NONE



                                         by Larissa Yepez NP 



                                         on 



                                         10/12/2015 



                                         23:21 





Extracted from:





  



                     Title: [facility] Follow-up Note     Author: Caridad Luis DO     Date:

 10/10/15









                                        Progress Note - Daily



[facility]             Completed:  Saturday, OCT 10, 2015, 07:28 
by Caridad Luis DO



RM: J864 - 02,  [id number] MARCUS [id number] (: 1931)  F     FIN: 
[id number]



Attending: Eleanor Encarnacion MDPhone: [phone]: General Surgery
Service



Reason for Admission: SDH S/P FALL

Working DRG: Traumatic stupor & coma, coma <1 hr w CC

Code status: None Specified=FULL CODECurrent diet: 

Isolation: None Documented



Allergies: morphine



SUBJECTIVE



No acute events overnight.  Patient denies any chest pain or SOB.  EKG and 
pacemaker interrogation has been done.  ECHO still pending.  Patient reports 
about 5 falls in the past 6-8 weeks.





OBJECTIVE



Gen: awake, laying in bed, no acute distress

HEENT: EOMI, PERRL, MMM

CV:RRR, nml s1 and s2, no extra heart sounds or murmurs

Resp: CTAB, no increased WOB on RA

Abd: NT, ND, soft, bowel sounds throughout

MSK:right hand splinted, right arm in sling, moves all extremities, pulses 2+ 
throughout, trace pedal edema

Neuro: CN2-12 intact, AAO x 4, masked facies

Skin: no rashes, +ecchymoses





                                        (no lab data in past 24 hours)



Anderson still necessary (Yes/No): Line still necessary (Yes/No): 



VitalsTmp(F)[id number]

                                        10/10 07:27----[id number]---

                                        10/10 04:1197.121761/490316---

                                        10/10 01:49--------------98 21%

                                        10/09 23:3698.335241/[id number]---

                                        10/09 21:21--------------95---





                                        24 Hr Tmax: 98.2F (36.78c) at 10/09 23:36Vital Signs are the last 5 in the past 

48 hours.



DateWt(kg)Wt(lb)Ht(cm)Ht(in)Method

                                        10/10 45.00  99.20064.00 59.84Measured

                                        10/08 45.20  99.44Measured

                                        10/07 (initial) 40.91  90.67122.40 60.00Estimated



I&ORecordInOutBal

                                        10/0924hr Tot  905    0  905

                                        10/0824hr Tot 1667  496 1171



Medications (15) Active

Scheduled Meds (13):

                                        10/08/15 acetaminophen 650 mg PO Q6H

                                        10/07/15 carbidopa-levodopa (carbidopa-levodopa 25 mg-100 mg oral tablet) 1 tab 

PO QID

                                        10/07/15 docusate 100 mg NG Q12H

                                        10/08/15 furosemide (Lasix 20 mg oral tablet) 20 mg PO Daily

                                        10/08/15 levothyroxine (Synthroid) 112 microgram PO Q630AM

                                        10/08/15 lisinopril 5 mg PO Daily

                                        10/09/15 non-formulary (Enoxaparin 20 mg (0.2 mL)) 20 mg SUB-Q [id number] 0 ml/hr

                                        10/10/15 nystatin topical (nystatin topical 100,000 units/g powder) 1 appl TOP BID



                                        10/08/15 phenytoin 100 mg PO Q12H

                                        10/07/15 pramipexole 0.25 mg PO TID

                                        10/08/15 pregabalin (Lyrica) 25 mg PO Q8H

                                        10/08/15 senna (Senokot) 17.2 mg PO Q12H

                                        10/08/15 tramadol 50 mg PO Q6H

Unscheduled Meds: None

PRN Meds (2):

                                        10/08/15 diphenhydrAMINE (Benadryl) 12.5 mg PO TID

                                        10/08/15 oxyCODONE (oxyCODONE 5 mg immediate release) 5 mg PO Q6H

One Time Meds: None

Continuous Infusions: None



EKG and Pacemaker Interrogation reviewed.





ASSESSMENT & EXAM



Ms. Bernardo is a 83 yo woman with PMH of atrial fibrillation on xarelto, HTN, 
CHF, Parkinson's and hypothyroidism who presents as transfer from OSH s/p fall 
who sustained right subdural hemorrhage, right clavical fracture, multiple rib 
fractures, and right metacarpal fracture. Cardiology consulted for 
anticoagulation for atrial fibrillation s/p subdural hemorrhage. While patient 
has a high [id number] (>5), her HAS-BLED score is also very high (score of 5, 
risk of bleed 9.1% per year).  Patient has had about 5 falls in the past 6-8 
weeks.  She has high risk for additional falls (given her previous fall, 
Parkinson's, and lives with her 86yo ). EKG Revealed ventricular paced 
rhythm with no obvious abnormalities.  [employer] Pacemaker interrogation
revealed VVIR mode with a lower rate limit of 70.  ECHO revealed grossly 
sufficient systolic function, official read pending.

We discussed with patient and her family regarding the risk-benefit ratio of 
anticoagulating for atrial fibrillation vs risk of additional bleeds.  At this 
time, the patient does not want to continue with anticoagulation.



Ultimately, we recommend daily aspirin 325 mg PO daily in this patient. We do 
not recommend further anticoagulation at this time (either w/ xarelto or 
coumadin).





RECOMMENDATIONS:

                                        - F/U Final ECHO results

                                        - Can consider starting Coreg if BP tolerates. Can continue lasix and hold lisinopril

 if needed.

                                        - Start aspirin 325 mg daily

                                        - Have patient follow-up with outpatient Cardiologist to further discuss risk/benefits

 of anticoagulation vs bleeding.





Thank you for the interesting consult. Case was discussed with Dr. Zhao on 
rounds.  Please contact the [facility] consult team for any further 
questions.





Caridad Luis 

MSO: [phone]

Pager: [phone]

Med-Peds PGY-3

I saw the patient (interview and examination), reviewed the available diagnostic
 and imaging data, discussed the findings with the resident, and agree with this
note.



Thank you for the opportunity to participate in the care of this patient.

Please [place] [phone] with any questions related to the care of this patient.



Blaine Zhao MD

## 2019-03-21 NOTE — XMS REPORT
Summary of Care: 3/9/16 - 3/10/16

                             Created on: 10/06/2030



BLANCA POWERS

External Reference #: [id number]

: 1931

Sex: Female



Demographics







                          Address                   [address] [place]  [address]-

 

                          Home Phone                [phone]

 

                          Preferred Language        English

 

                          Marital Status            

 

                          Restoration Affiliation     Jain

 

                          Race                      White/

 

                          Ethnic Group              Non-





Author







                          Author                    [facility]

 

                          Organization              [facility]

 

                          Address                   Unknown

 

                          Phone                     Unavailable







Encounter





WILFREDO Hidalgo(MANE) [id number] Date(s): 3/9/16 - 3/10/16

[facility] [address] [employer] provided by         
  [employer]       [place] [address]-    
[phone]

Discharge Disposition: Home

Attending Physician: Marguerite Muñoz MD

Admitting Physician: Marguerite Muñoz MD

Referring Physician: Marguerite Muñoz MD





Vital Signs







                    1                   2                   3



                                         Most recent to   



                                         oldest [Reference   



                                         Range]:   

 

                                        165.1 cm 

                          (3/10/16 7:47 AM)         165.1 cm 

                          (3/9/16 11:22 AM)          



                                         Height   

 

                                        97.7 DegF 

                          (3/10/16 8:40 AM)         97.4 DegF 

                          (3/10/16 5:33 AM)         98.9 DegF 

                                        (3/10/16 3:12 AM)



                                         Temperature Oral   



                                         [96.4-99.1 DegF]   

 

                                        115/52 mmHg 

                          (3/10/16 3:59 PM)         91/50 mmHg 

                          (3/10/16 11:28 AM)        103/54 mmHg 

                                        (3/10/16 5:30 AM)



                                         Blood Pressure   



                                         [/60-90 mmHg]   

 

                                        28 BRMIN 

*HI*

                          (3/10/16 11:28 AM)        29 BRMIN 

*HI*

                          (3/10/16 8:05 AM)         18 BRMIN 

                                        (3/10/16 5:30 AM)



                                         Respiratory Rate   



                                         [14-20 BRMIN]   

 

                                        58.636 kg 

                          (3/10/16 7:47 AM)         58.636 kg 

                          (3/9/16 11:22 AM)          



                                         Weight   

 

                                        21.51 m2 

                          (3/10/16 7:47 AM)         21.51 m2 

                          (3/9/16 11:22 AM)          



                                         Body Mass Index   







Problem List







    



              Condition     Effective Dates     Status       Health Status     Informant

 

    



                           A-fib(Confirmed)          Resolved  

 

    



                           Anxiety(Confirmed)        Resolved  

 

    



                           Cardiac                   Resolved  



                                         pacemaker(Confirmed)    

 

    



                           CHF - Congestive          Resolved  



                                         heart    



                                         failure(Confirmed)    

 

    



                           Closed hip                Resolved  



                                         fracture(Confirmed)1    

 

    



                           CVA (cerebral             Resolved  



                                         vascular    



                                         accident)(Confirmed)    

 

    



                           Dementia(Confirmed)       Resolved  

 

    



                           Parkinson's               Resolved  



                                         disease(Confirmed)    







1bilateral



Allergies, Adverse Reactions, Alerts







   



                 Substance       Reaction        Severity        Status

 

   



                           NKDA                      Active







Medications





acetaminophen-hydrocodone 325 mg-10 mg oral tablet

1 tab, Route: PO, Drug Form: TAB, Dosing Weight 58.636, kg, Q6H, PRN Pain Score 
6-10, Start date: 16 22:50:00, Duration: 30 day, Stop date: 16 22:49
:00

Notes: Do not exceed 4gm/day of acetaminophen.  (Same as: Norco 325/10)

Start Date: 3/9/16

Stop Date: 3/10/16

Status: Discontinued



ALPRAZOLam 0.25 mg oral tablet

0.25 mg, 1 tab, Route: PO, Drug form: TAB, Q12H, Dosing Weight 58.636, kg, PRN A
nxiety, Start date: 16 22:50:00, Duration: 30 day, Stop date: 16 22:
49:00

Notes: With food or milk(Same as: Xanax)

Start Date: 3/9/16

Stop Date: 3/10/16

Status: Discontinued



aspirin 81 mg tablet, enteric coated

81 mg=1 tab, PO, Daily, # 30 tab, 3 Refill(s)

Start Date: 3/10/16

Stop Date: 16

Status: Ordered



carbidopa-levodopa 25 mg-100 mg oral tablet

1 tab, Route: PO, Drug Form: TAB, Dosing Weight 58.636, kg, QID, Start date:  23:20:00, Duration: 30 day, Stop date: 16 21:00:00

Notes: Take with milk or food. (Same As: Sinemet)

Start Date: 3/9/16

Stop Date: 3/10/16

Status: Discontinued



carvedilol

3.125 mg, 1 tab, Route: PO, Drug form: TAB, BID, Dosing Weight 58.636, kg, Start
date: 03/10/16 9:00:00, Duration: 30 day, Stop date: 16 17:00:00

Notes: Give with food. (Same As: Coreg)

Start Date: 3/10/16

Stop Date: 3/10/16

Status: Discontinued



fentaNYL

50 microgram, Route: IV, ONCE, Dosing Weight 58.636, kg, Start date: 16 20
:15:00, Stop date: 16 20:15:00

Start Date: 3/9/16

Stop Date: 3/9/16

Status: Completed



fentaNYL

25 microgram, Route: IV, ONCE, Dosing Weight 58.636, kg, Start date: 16 18
:50:00, Stop date: 16 18:50:00

Start Date: 3/9/16

Stop Date: 3/9/16

Status: Completed



fentaNYL

50 microgram, Route: IV, ONCE, Dosing Weight 58.636, kg, Start date: 16 18
:55:00, Stop date: 16 18:55:00

Start Date: 3/9/16

Stop Date: 3/9/16

Status: Completed



fentaNYL

50 microgram, Route: IV, ONCE, Dosing Weight 58.636, kg, Start date: 16 19
:27:00, Stop date: 16 19:27:00

Start Date: 3/9/16

Stop Date: 3/9/16

Status: Completed



fentaNYL

50 microgram, Route: IV, ONCE, Dosing Weight 58.636, kg, Start date: 16 20
:39:00, Stop date: 16 20:39:00

Start Date: 3/9/16

Stop Date: 3/9/16

Status: Completed



fentaNYL

50 microgram, Route: IV, ONCE, Dosing Weight 58.636, kg, Start date: 16 20
:28:00, Stop date: 16 20:28:00

Start Date: 3/9/16

Stop Date: 3/9/16

Status: Completed



fentaNYL

25 microgram, 0.5 mL, Route: IVP, Drug form: INJ, ONCE, Dosing Weight 58.636, kg
, PRN Pain Score 4-6, Start date: 16 18:02:00

Notes: (Same as: Sublimaze)  Preservative free.

Start Date: 3/9/16

Stop Date: 3/9/16

Status: Completed



Flexeril 10 mg oral tablet

10 mg=1 tab, PO, TID, PRN for spasm, X 10 day, # 30 tab, 0 Refill(s)

Start Date: 3/10/16

Stop Date: 3/20/16

Status: Ordered



flumazenil

0.2 mg, 2 mL, Route: IVP, Drug form: INJ, PRN, Dosing Weight 58.636, kg, PRN Alin
zodiazepine Reversal, Initial dose, Start date: 16 18:02:00, Duration: 30 
day, Stop date: 16 19:01:00

Notes: (Same as: Romazicon)

Start Date: 3/9/16

Stop Date: 3/9/16

Status: Discontinued



gabapentin 100 mg oral capsule

100 mg, 1 cap, Route: PO, Drug form: CAP, TID, Dosing Weight 58.636, kg, Start d
ate: 03/10/16 3:40:00, Duration: 30 day, Stop date: 16 17:00:00

Notes: (Same as: Neurontin)

Start Date: 3/10/16

Stop Date: 3/10/16

Status: Discontinued



gabapentin 100 mg oral capsule

100 mg=1 cap, PO, TID, # 90 cap, 1 Refill(s)

Start Date: 3/9/16

Status: Ordered



hydrALAZINE

10 mg, 0.5 mL, Route: IVP, Drug form: INJ, Q20Min, Dosing Weight 58.636, kg, PRN
Elevated BP, Start date: 16 18:02:00, Duration: 2 doses or times, Stop da
te: Limited # of times

Notes: (Same as: Apresoline)Push over 5 minutes

Start Date: 3/9/16

Stop Date: 3/10/16

Status: Discontinued



Hydrocortisone-Aloe 0.5% topical cream

1 appl, Route: TOP, QID, Drug form: CRM, Priority: Now, Start date: 03/10/16 17:
00:00, Duration: 30 day, Stop date: 16 13:00:00

Start Date: 3/10/16

Stop Date: 3/10/16

Status: Discontinued



Lasix 20 mg oral tablet

20 mg, 1 tab, Route: PO, Drug form: TAB, Daily, Dosing Weight 58.636, kg, Start 
date: 03/10/16 9:00:00, Duration: 30 day, Stop date: 16 9:00:00

Notes: (Same as: Lasix)  May cause GI upset.  Give with food or milk.

Start Date: 3/10/16

Stop Date: 3/10/16

Status: Discontinued



magnesium sulfate

2 gm, 50 mL, Route: IVPB, Drug form: INJ, Q2H, Dosing Weight 58.636, kg, Total d
ose=4 gm, Start date: 03/10/16 12:00:00, Duration: 2 doses or times, Stop date: 
03/10/16 14:00:00

Notes: WASTE: F/P - Sink; E - Municipal Trash Bin

Start Date: 3/10/16

Stop Date: 3/10/16

Status: Completed



morphine Sulfate

2 mg, 1 mL, Route: IVP, Drug form: INJ, ONCE, Dosing Weight 58.636, kg, Priority
: STAT, Start date: 03/10/16 13:13:00, Stop date: 03/10/16 13:13:00

Notes: (Same as:MORPhine Sulfate)

Start Date: 3/10/16

Stop Date: 3/10/16

Status: Completed



morphine Sulfate

0.25 mg, 0.13 mL, Route: IV, Drug form: INJ, ONCE, Dosing Weight 58.636, kg, Sta
rt date: 16 23:00:00, Stop date: 16 23:00:00

Notes: (Same as:MORPhine Sulfate)

Start Date: 3/9/16

Stop Date: 3/9/16

Status: Completed



morphine Sulfate

0.25 mg, 0.13 mL, Route: IV, Drug form: INJ, ONCE, Dosing Weight 58.636, kg, Sta
rt date: 16 22:34:00, Stop date: 16 22:34:00

Notes: (Same as:MORPhine Sulfate)

Start Date: 3/9/16

Stop Date: 3/9/16

Status: Discontinued



naloxone

0.04 mg, 0.1 mL, Route: IVP, Drug form: INJ, Q2MIN, Dosing Weight 58.636, kg, MO
N Narcotic Reversal, Start date: 16 18:02:00, Duration: 8 doses or times, 
Stop date: Limited # of times

Notes: Same as Narcan

Start Date: 3/9/16

Stop Date: 3/10/16

Status: Discontinued



Norco 10/325 oral tablet

1 tab, Route: PO, Drug Form: TAB, Dosing Weight 58.636, kg, ONCE, Start date:  21:17:00, Stop date: 16 21:17:00

Notes: Do not exceed 4gm/day of acetaminophen.  (Same as: Norco 325/10)

Start Date: 3/9/16

Stop Date: 3/9/16

Status: Completed



normal saline 0.9% IV 1,000 mL

1,000 mL, Rate: 50 ml/hr, Infuse over: 20 hr, Route: IV, Dosing Weight 58.636 kg
, Total Volume: 1,000, Start date: 16 11:24:00, Duration: 30 day, Stop brandi
e: 16 11:23:00

Start Date: 3/9/16

Stop Date: 3/10/16

Status: Discontinued



ondansetron

4 mg, 2 mL, Route: IVP, Drug form: INJ, ONCE, Dosing Weight 58.636, kg, PRN Naus
ea & Vomiting, Start date: 16 18:02:00

Notes: (Same as: Zofran)  *** MEDICATION WASTE ***Product Size:  4 mgProduct Was
tati:  _0_ mg

Start Date: 3/9/16

Stop Date: 3/10/16

Status: Discontinued



pramipexole

0.25 mg, 1 tab, Route: PO, Drug form: TAB, TID, Dosing Weight 58.636, kg, Start 
date: 03/10/16 9:00:00, Duration: 30 day, Stop date: 16 17:00:00

Notes: (Same as: Mirapex)

Start Date: 3/10/16

Stop Date: 3/10/16

Status: Discontinued



Synthroid

112 microgram, 1 tab, Route: PO, Drug form: TAB, Q630AM, Dosing Weight 58.636, k
g, Start date: 03/10/16 6:30:00, Duration: 30 day, Stop date: 16 6:30:00

Notes: Take 1 hour before or 2 hours after meal; Enteral feeds may interefere wi
th the absorption of this medication.(Same as:Levothroid)

Start Date: 3/10/16

Stop Date: 3/10/16

Status: Discontinued



Results





BLOOD BANK RESULTS





                    1                   2                   3



                                         Most recent to   



                                         oldest [Reference   



                                         Range]:   

 

                                        A POS 

*Unknown*

                    (3/9/16 11:28 AM)                          



                                         ABO/Rh   

 

                                        Negative 

                    (3/9/16 11:28 AM)                          



                                         Antibody Scrn   

 

                                        Product available 

                    (3/9/16 11:22 AM)                          



                                         RBC product   







ELECTROLYTES





                    1                   2                   3



                                         Most recent to   



                                         oldest [Reference   



                                         Range]:   

 

                                        141 mEq/L 

                          (3/10/16 5:26 AM)         140 mEq/L 

                          (3/9/16 11:28 AM)          



                                         Sodium Lvl [135-145   



                                         mEq/L]   

 

                                        3.9 mEq/L 

                          (3/10/16 5:26 AM)         3.9 mEq/L 

                          (3/9/16 11:28 AM)          



                                         Potassium Lvl   



                                         [3.5-5.1 mEq/L]   

 

                                        108 mEq/L 

                          (3/10/16 5:26 AM)         103 mEq/L 

                          (3/9/16 11:28 AM)          



                                         Chloride Lvl [   



                                         mEq/L]   

 

                                        23 mEq/L 

*LOW*

                          (3/10/16 5:26 AM)         27 mEq/L 

                          (3/9/16 11:28 AM)          



                                         CO2 [24-32 mEq/L]   

 

                                        13.9 mEq/L 

                          (3/10/16 5:26 AM)         13.9 mEq/L 

                          (3/9/16 11:28 AM)          



                                         AGAP [10.0-20.0   



                                         mEq/L]   







CHEM PANEL





                    1                   2                   3



                                         Most recent to   



                                         oldest [Reference   



                                         Range]:   

 

                                        0.93 mg/dL 

                          (3/10/16 5:26 AM)         0.76 mg/dL 

                          (3/9/16 11:28 AM)          



                                         Creatinine Lvl   



                                         [0.50-1.40 mg/dL]   

 

                                        56 mL/min/1.73m2 1

*NA*

                          (3/10/16 5:26 AM)         72 mL/min/1.73m2 2

*NA*

                          (3/9/16 11:28 AM)          



                                         eGFR   

 

                                        19 mg/dL 

                          (3/10/16 5:26 AM)         18 mg/dL 

                          (3/9/16 11:28 AM)          



                                         BUN [7-22 mg/dL]   

 

                                        24 

                    (3/9/16 11:28 AM)                          



                                         B/C Ratio [6-25]   

 

                                        122 mg/dL 

*HI*

                          (3/10/16 5:26 AM)         95 mg/dL 

                          (3/9/16 11:28 AM)          



                                         Glucose Lvl [70-99   



                                         mg/dL]   

 

                                        7.4 g/dL 

                    (3/9/16 11: AM)                          



                                         Total Protein   



                                         [6.4-8.4 g/dL]   

 

                                        3.4 g/dL 

*LOW*

                    (3/9/16 11: AM)                          



                                         Albumin Lvl [3.5-5.0   



                                         g/dL]   

 

                                        4.0 g/dL 

                    (3/9/16 11: AM)                          



                                         Globulin [2.0-4.0   



                                         g/dL]   

 

                                        0.8 

                    (3/9/16 11:28 AM)                          



                                         A/G Ratio [0.7-1.6]   

 

                                        8.9 mg/dL 

                          (3/10/16 5:26 AM)         9.6 mg/dL 

                          (3/9/16 11:28 AM)          



                                         Calcium Lvl   



                                         [8.5-10.5 mg/dL]   

 

                                        3.1 mg/dL 

                    (3/10/16 5:26 AM)                          



                                         Phosphorus [2.5-4.5   



                                         mg/dL]   

 

                                        1.7 mg/dL 

*LOW*

                          (3/10/16 5:26 AM)         2.0 mg/dL 

                          (3/9/16 11:28 AM)          



                                         Magnesium Lvl   



                                         [1.8-2.4 mg/dL]   

 

                                        7 unit/L 

                    (3/9/16 11:28 AM)                          



                                         ALT [0-65 unit/L]   

 

                                        13 unit/L 

                    (3/9/16 11:28 AM)                          



                                         AST [0-37 unit/L]   

 

                                        102 unit/L 

                    (3/9/16 11:28 AM)                          



                                         Alk Phos [   



                                         unit/L]   

 

                                        0.5 mg/dL 

                    (3/9/16 11:28 AM)                          



                                         Bili Total [0.2-1.3   



                                         mg/dL]   







1Result Comment: The eGFR is calculated using the CKD-EPI formula. In most 
young, healthy individuals the eGFR will be >90 mL/min/1.73m2. The eGFR declines
with age. An eGFR of 60-89 may be normal in some populations, particularly the 
elderly, for whom the CKD-EPI formula has not been extensively validated. Use of
the eGFR is not recommended in the following populations:



Individuals with unstable creatinine concentrations, including pregnant patients
and those with serious co-morbid conditions.



Patients with extremes in muscle mass or diet. 



The data above are obtained from the National Kidney Disease Education Program (
NKDEP) which additionally recommends that when the eGFR is used in patients with
extremes of body mass index for purposes of drug dosing, the eGFR should be mul
tiplied by the estimated BMI.



2Result Comment: The eGFR is calculated using the CKD-EPI formula. In most 
young, healthy individuals the eGFR will be >90 mL/min/1.73m2. The eGFR declines
with age. An eGFR of 60-89 may be normal in some populations, particularly the 
elderly, for whom the CKD-EPI formula has not been extensively validated. Use of
the eGFR is not recommended in the following populations:



Individuals with unstable creatinine concentrations, including pregnant patients
and those with serious co-morbid conditions.



Patients with extremes in muscle mass or diet. 



The data above are obtained from the National Kidney Disease Education Program (
NKDEP) which additionally recommends that when the eGFR is used in patients with
extremes of body mass index for purposes of drug dosing, the eGFR should be mul
tiplied by the estimated BMI.



HEMATOLOGY





                    1                   2                   3



                                         Most recent to   



                                         oldest [Reference   



                                         Range]:   

 

                                        14.2 K/CMM 

*HI*

                          (3/10/16 2:00 PM)         19.5 K/CMM 

*HI*

                          (3/10/16 5:26 AM)         9.5 K/CMM 

                                        (3/9/16 11:28 AM) 



                                         WBC [3.7-10.4 K/CMM]   

 

                                        4.80 M/CMM 

                          (3/10/16 2:00 PM)         4.83 M/CMM 

                          (3/10/16 5:26 AM)         4.83 M/CMM 

                                        (3/9/16 11:28 AM) 



                                         RBC [4.20-5.40   



                                         M/CMM]   

 

                                        12.1 g/dL 

                          (3/10/16 2:00 PM)         12.8 g/dL 

                          (3/10/16 5:26 AM)         12.4 g/dL 

                                        (3/9/16 11:28 AM) 



                                         Hgb [12.0-16.0 g/dL]   

 

                                        39.4 % 

                          (3/10/16 2:00 PM)         40.0 % 

                          (3/10/16 5:26 AM)         39.8 % 

                                        (3/9/16 11:28 AM) 



                                         Hct [36.0-48.0 %]   

 

                                        82.2 fL 

                          (3/10/16 2:00 PM)         82.9 fL 

                          (3/10/16 5:26 AM)         82.5 fL 

                                        (3/9/16 11:28 AM) 



                                         MCV [80.0-98.0 fL]   

 

                                        25.2 pg 

*LOW*

                          (3/10/16 2:00 PM)         26.5 pg 

*LOW*

                          (3/10/16 5:26 AM)         25.8 pg 

*LOW*

                                        (3/9/16 11:28 AM) 



                                         MCH [27.0-31.0 pg]   

 

                                        30.7 g/dL 

*LOW*

                          (3/10/16 2:00 PM)         31.9 g/dL 

*LOW*

                          (3/10/16 5:26 AM)         31.2 g/dL 

*LOW*

                                        (3/9/16 11:28 AM) 



                                         MCHC [32.0-36.0   



                                         g/dL]   

 

                                        14.1 % 

                          (3/10/16 2:00 PM)         14.2 % 

                          (3/10/16 5:26 AM)         14.3 % 

                                        (3/9/16 11:28 AM) 



                                         RDW [11.5-14.5 %]   

 

                                        240 K/CMM 

                          (3/10/16 2:00 PM)         234 K/CMM 

                          (3/10/16 5:26 AM)         257 K/CMM 

                                        (3/9/16 11:28 AM) 



                                         Platelet [133-450   



                                         K/CMM]   

 

                                        9.1 fL 

                          (3/10/16 2:00 PM)         9.1 fL 

                          (3/10/16 5:26 AM)         8.8 fL 

                                        (3/9/16 11:28 AM) 



                                         MPV [7.4-10.4 fL]   

 

                                        85.2 % 

*HI*

                          (3/10/16 2:00 PM)         54.0 % 

                          (3/10/16 5:26 AM)         63.8 % 

                                        (3/9/16 11:28 AM) 



                                         Segs [45.0-75.0 %]   

 

                                        42.0 % 

*HI*

                    (3/10/16 5:26 AM)                          



                                         Bands [0.0-11.0 %]   

 

                                        4.0 % 

*LOW*

                          (3/10/16 2:00 PM)         1.0 % 

*LOW*

                          (3/10/16 5:26 AM)         16.0 % 

*LOW*

                                        (3/9/16 11:28 AM) 



                                         Lymphocytes   



                                         [20.0-40.0 %]   

 

                                        0.0 % 

                    (3/10/16 5:26 AM)                          



                                         Atypical Lymphs   



                                         [<=0.0 %]   

 

                                        6.2 % 

                          (3/10/16 2:00 PM)         3.0 % 

                          (3/10/16 5:26 AM)         16.3 % 

*HI*

                                        (3/9/16 11:28 AM) 



                                         Monocytes [2.0-12.0   



                                         %]   

 

                                        4.3 % 

*HI*

                          (3/10/16 2:00 PM)         3.9 % 

                          (3/9/16 11:28 AM)          



                                         Eosinophils [0.0-4.0   



                                         %]   

 

                                        0.3 % 

                          (3/10/16 2:00 PM)         0.9 % 

                          (3/9/16 11:28 AM)          



                                         Basophils [0.0-1.0   



                                         %]   

 

                                        12.1 K/CMM 

*HI*

                          (3/10/16 2:00 PM)         18.7 K/CMM 

*HI*

                          (3/10/16 5:26 AM)         6.1 K/CMM 

                                        (3/9/16 11:28 AM) 



                                         Segs-Bands #   



                                         [1.5-8.1 K/CMM]   

 

                                        0.6 K/CMM 

*LOW*

                          (3/10/16 2:00 PM)         0.2 K/CMM 

*LOW*

                          (3/10/16 5:26 AM)         1.5 K/CMM 

                                        (3/9/16 11:28 AM) 



                                         Lymphocytes #   



                                         [1.0-5.5 K/CMM]   

 

                                        0.9 K/CMM 

*HI*

                          (3/10/16 2:00 PM)         0.6 K/CMM 

                          (3/10/16 5:26 AM)         1.5 K/CMM 

*HI*

                                        (3/9/16 11:28 AM) 



                                         Monocytes # [0.0-0.8   



                                         K/CMM]   

 

                                        0.6 K/CMM 

*HI*

                          (3/10/16 2:00 PM)         0.4 K/CMM 

                          (3/9/16 11:28 AM)          



                                         Eosinophils #   



                                         [0.0-0.5 K/CMM]   

 

                                        0.1 K/CMM 

                    (3/9/16 11:28 AM)                          



                                         Basophils # [0.0-0.2   



                                         K/CMM]   

 

                                        Normal 

                    (3/10/16 5:26 AM)                          



                                         RBC Morph   

 

                                        Normal 

                          (3/10/16 2:00 PM)         Normal 

                          (3/10/16 5:26 AM)          



                                         Plt Morph   

 

                                        21.8 seconds 

*HI*

                          (3/10/16 5:26 AM)         22.2 seconds 

*HI*

                          (3/9/16 11:28 AM)          



                                         PT [12.0-14.7   



                                         seconds]   

 

                                        1.86 

*HI*

                          (3/10/16 5:26 AM)         1.91 

*HI*

                          (3/9/16 11:28 AM)          



                                         INR [0.85-1.17]   

 

                                        33.4 seconds 

                          (3/10/16 5:26 AM)         38.0 seconds 

*HI*

                          (3/9/16 11:28 AM)          



                                         PTT [22.9-35.8   



                                         seconds]   







Immunizations







  



                     Vaccine             Date                Refusal Reason

 

  



                     influenza virus vaccine, inactivated     10/8/15             Patient Refuses

 

  



                     pneumococcal 23-valent vaccine     10/8/15             Patient Refuses







Procedures





No data available for this section



Social History







 



                           Social History Type       Response

 

 



                           Smoking Status            Never smoker; Exposure to Tobacco Smoke None; Cigarette Smoking

 Last 365



                                         Days No; Reg Smoking Cessation Counseling No







Assessment and Plan





No data available for this section

## 2019-03-21 NOTE — XMS REPORT
Summary of Care: 16 - 16

                             Created on: 2059



GIOBLANCA GIBBONS

External Reference #: [id number]

: 1931

Sex: Female



Demographics







                          Address                   [address] [place]  [address]-

 

                          Home Phone                [phone]

 

                          Preferred Language        English

 

                          Marital Status            

 

                          [employer] Affiliation     Gnosticist

 

                          Race                      White/

 

                          Ethnic Group              Non-





Author







                          Author                    [facility]

 

                          Organization              [facility]

 

                          Address                   Unknown

 

                          Phone                     Unavailable







Encounter





WILFREDO Hidalgo(MANE) [id number] Date(s): 16 - 16

[address] (143)
[phone]

Discharge Disposition: Home

Attending Physician: Marguerite Muñoz MD

Referring Physician: Marguerite Muñoz MD





Vital Signs







 



                           Most recent to            1



                                         oldest [Reference 



                                         Range]: 

 

 



                           Height                    165.1 cm



                                         (16 7:42 AM)

 

 



                           Weight                    54.545 kg



                                         (16 7:42 AM)

 

 



                           Body Mass Index           20.01 m2



                                         (16 7:42 AM)







Problem List







    



              Condition     Effective Dates     Status       Health Status     Informant

 

    



                           A-fib(Confirmed)          Resolved  

 

    



                           Anxiety(Confirmed)        Resolved  

 

    



                           Cardiac                   Resolved  



                                         pacemaker(Confirmed)    

 

    



                           CHF - Congestive          Resolved  



                                         heart    



                                         failure(Confirmed)    

 

    



                           Closed hip                Resolved  



                                         fracture(Confirmed)1    

 

    



                           CVA (cerebral             Resolved  



                                         vascular    



                                         accident)(Confirmed)    

 

    



                           Dementia(Confirmed)       Resolved  

 

    



                           Parkinson's               Resolved  



                                         disease(Confirmed)    







1bilateral



Allergies, Adverse Reactions, Alerts







   



                 Substance       Reaction        Severity        Status

 

   



                           NKDA                      Active







Medications





No data available for this section



Results





No data available for this section



Immunizations







  



                     Vaccine             Date                Refusal Reason

 

  



                     influenza virus vaccine, inactivated     10/8/15             Patient Refuses

 

  



                     pneumococcal 23-valent vaccine     10/8/15             Patient Refuses







Procedures





No data available for this section



Social History







 



                           Social History Type       Response

 

 



                           Smoking Status            Never smoker; Exposure to Tobacco Smoke None; Cigarette Smoking

 Last 365



                                         Days No; Reg Smoking Cessation Counseling No







Assessment and Plan





No data available for this section

## 2019-03-21 NOTE — XMS REPORT
Continuity of Care Document

                             Created on: 2016



BLANCA POWERS

External Reference #: [id number]

: 1931

Sex: Female



Demographics







                          Address                   [address] [place] [address]  [id number]

 

                          Home Phone                [phone]

 

                          Preferred Language        Unknown

 

                          Marital Status            Unknown

 

                          Yazidism Affiliation     Unknown

 

                          Race                      Unknown

 

                          Ethnic Group              Unknown





Author







                          Author                    Dony sanchezann

 

                          Organization              Interface

 

                          Address                   Unknown

 

                          Phone                     Unavailable



                                                    



Problems

                    





                    Problem                            Status                            Onset Date     

                          Classification                            Date Reported       

                          Comments                            Source                    

 

                    I48.2                            Active                            2016       

                                                                                       

                                        [facility]                    

 

                          PRE ADMIT/*GEN ANESTHESIA*/WATCHMAN NAOMI                            Active        

                    2016                                                         

                                                       [facility]       

             

 

                    AFIB                            Active                            2016        

                                                                                       

                                        [facility]                    

 

                    CHRONIC AFIB                            Active                            2016

                                                                                       

                                        [facility]                    

 

                          M79.641 - PAIN IN RIGHT HAND                            Active                    

                    10/15/2015                                                                     

                                                       [facility]                    

 

                    SDH S/P FALL                            Active                            10/07/2015

                                                                                       

                                        [facility]                    

 

                    A-fib                            Resolved                                           

                    Problem                            2016                             

                                        [facility]                 

   

 

                    Anxiety                            Resolved                                         

                          Problem                            2016                         

                                                      [facility], [facility]               

     

 

                    Cardiac pacemaker                            Resolved                               

                          Problem                            2016               

                                                      [facility], [facility]     

               

 

                          CHF - Congestive heart failure                            Resolved                

                                                Problem                            2016  

                                                      [facility]                    

 

                          Closed hip fracture<sup>1</sup>                            Resolved               

                                                Problem                            2016 

                           bilateral                            [facility]

 [facility]                    

 

                          CVA (<span ID="[id number]">Confirmed</span>)                            Resolved 

                                                       Problem                       

                    2016                                                        [facility], [facility]                    

 

                    Dementia                            Resolved                                        

                          Problem                            2016                        

                                                      [facility], [facility]              

      

 

                    Parkinson's disease                            Resolved                             

                           Problem                            2016             

                                                      [facility] ST[facility]   

                 

 

                          SUBDURAL HEMORRHAGE DUE TO BIRTH INJURY                            Active         

                                                                                        

                                                      [facility]                  

  

 

                          OTHER  SPECIFIED CONGENITAL DEFORMITIES                             Active        

                                                                                       

                                                      [facility]                 

   

 

                          CHRONIC ATRIAL FIBRILLATION                            Active                     

                                                                                                    

                                                      [facility]                    



                                                                                
                                                                                
                                                                                
                                                                                
                    



Medications

                    





                    Medication                            Details                            Route      

                          Status                            Patient Instructions         

                          Ordering Provider                            Order Date           

                                        Source                    

 

                          Hydrocortisone-Aloe 0.5% topical cream                            1 appl, Route: TOP,

 QID, Drug form: CRM, Priority: Now, Start date: 03/10/16 17:00:00, Duration: 30
day, Stop date: 16 13:00:00                                                  

                    Inactive                                                                         

                          03/10/2016                            [facility]             

       

 

                          Aspirin 81 MG Enteric Coated Tablet                            81 mg=1 tab, PO, Daily,

 # 30 tab, 3 Refill(s)                                                        Active 

                                                                                   03/10/2016

                                        [facility]                    

 

                          Morphine                            2 mg, 1 mL, Route: IVP, Drug form: INJ, ONCE, 

Dosing Weight 58.636, kg, Priority: STAT, Start date: 03/10/16 13:13:00, Stop 
date: 03/10/16 13:13:00Notes: (Same as:MORPhine Sulfate)                        
                                                Inactive                                                

                                                03/10/2016                            [facility]                    

 

                                        Cyclobenzaprine hydrochloride 10 MG Oral Tablet [Flexeril]                      

                                        10 mg=1 tab, PO, TID, PRN for spasm, X 10 day, # 30 tab, 0 Refill(s)         

                                                Active                                  

                                                      03/10/2016                         

                                        [facility]                    

 

                          Magnesium Sulfate                            2 gm, 50 mL, Route: IVPB, Drug form: 

INJ, Q2H, Dosing Weight 58.636, kg, Total dose=4 gm, Start date: 03/10/16 
12:00:00, Duration: 2 doses or times, Stop date: 03/10/16 14:00:00Notes: WASTE: 
F/P - Sink; E - Municipal Trash Bin                                                

                    Inactive                                                                       

                          03/10/2016                            [facility]           

         

 

                          Furosemide 20 MG Oral Tablet [Lasix]                            20 mg, 1 tab, Route:

 PO, Drug form: TAB, Daily, Dosing Weight 58.636, kg, Start date: 03/10/16 
9:00:00, Duration: 30 day, Stop date: 16 9:00:00Notes: (Same as: Lasix)  
May cause GI upset.  Give with food or milk.                                       

                    Inactive                                                              

                          03/10/2016                            [facility]  

                  

 

                          carvedilol                            3.125 mg, 1 tab, Route: PO, Drug form: TAB, 

BID, Dosing Weight 58.636, kg, Start date: 03/10/16 9:00:00, Duration: 30 day, 
Stop date: 16 17:00:00Notes: Give with food. (Same As: Coreg)             
                                                Inactive                                    

                                                03/10/2016                            [facility]                    

 

                          Pramipexole                            0.25 mg, 1 tab, Route: PO, Drug form: TAB, 

TID, Dosing Weight 58.636, kg, Start date: 03/10/16 9:00:00, Duration: 30 day, 
Stop date: 16 17:00:00Notes: (Same as: Mirapex)                              

                          Inactive                                                   

                                                03/10/2016                            [facility]                    

 

                          Synthroid                            112 microgram, 1 tab, Route: PO, Drug form: TAB,

 Q630AM, Dosing Weight 58.636, kg, Start date: 03/10/16 6:30:00, Duration: 30 
day, Stop date: 16 6:30:00Notes: Take 1 hour before or 2 hours after meal;
 Enteral feeds may interefere with the absorption of this medication.(Same 
as:Levothroid)                                                        Inactive       

                                                                             03/10/2016

                                        [facility]                    

 

                          gabapentin 100 MG Oral Capsule                            100 mg, 1 cap, Route: PO,

 Drug form: CAP, TID, Dosing Weight 58.636, kg, Start date: 03/10/16 3:40:00, 
Duration: 30 day, Stop date: 16 17:00:00Notes: (Same as: Neurontin)       
                                                 Inactive                              

                                                      03/10/2016                     

                                        [facility]                    

 

                          Carbidopa 25 MG / Levodopa 100 MG Oral Tablet                            1 tab, Route:

 PO, Drug Form: TAB, Dosing Weight 58.636, kg, QID, Start date: 16 
23:20:00, Duration: 30 day, Stop date: 16 21:00:00Notes: Take with milk or
 food. (Same As: Sinemet)                                                        No Longer

 Active                                                                              

                          03/10/2016                            [facility]                  

  

 

                          Morphine                            0.25 mg, 0.13 mL, Route: IV, Drug form: INJ, ONCE,

 Dosing Weight 58.636, kg, Start date: 16 23:00:00, Stop date: 16 
23:00:00Notes: (Same as:MORPhine Sulfate)                                          

                    Inactive                                                                 

                          03/10/2016                            [facility]     

               

 

                          Alprazolam 0.25 MG Oral Tablet                            0.25 mg, 1 tab, Route: PO,

 Drug form: TAB, Q12H, Dosing Weight 58.636, kg, PRN Anxiety, Start date: 
16 22:50:00, Duration: 30 day, Stop date: 16 22:49:00Notes: With 
food or milk (Same as: Xanax)                                                        

No Longer Active                                                                     

                          03/10/2016                            [facility]         

           

 

                                        Acetaminophen 325 MG / Hydrocodone Bitartrate 10 MG Oral Tablet                 

                                        1 tab, Route: PO, Drug Form: TAB, Dosing Weight 58.636, kg, Q6H, PRN Pain

 Score 6-10, Start date: 16 22:50:00, Duration: 30 day, Stop date: 
16 22:49:00Notes: Do not exceed 4gm/day of acetaminophen.  (Same as: Norco
 325/10)                                                        No Longer Active     

                                                                               03/10/2016

                                        [facility]                    

 

                          Morphine                            0.25 mg, 0.13 mL, Route: IV, Drug form: INJ, ONCE,

 Dosing Weight 58.636, kg, Start date: 16 22:34:00, Stop date: 16 
22:34:00Notes: (Same as:MORPhine Sulfate)                                          

                    Inactive                                                                 

                          03/10/2016                            [facility]     

               

 

                                        Acetaminophen 325 MG / Hydrocodone Bitartrate 10 MG Oral Tablet [Norco 10/325]  

                                        1 tab, Route: PO, Drug Form: TAB, Dosing Weight 58.636, kg,

 ONCE, Start date: 16 21:17:00, Stop date: 16 21:17:00Notes: Do not 
exceed 4gm/day of acetaminophen.  (Same as: Norco 325/10)                       
                                                Inactive                                               

                                                03/10/2016                            [facility]                    

 

                          Fentanyl                            50 microgram, Route: IV, ONCE, Dosing Weight 58.636,

 kg, Start date: 16 20:39:00, Stop date: 16 20:39:00                
                                                Inactive                                        

                                                03/10/2016                            [facility]                    

 

                          Fentanyl                            50 microgram, Route: IV, ONCE, Dosing Weight 58.636,

 kg, Start date: 16 20:28:00, Stop date: 16 20:28:00                
                                                Inactive                                        

                                                03/10/2016                            [facility]                    

 

                          Fentanyl                            50 microgram, Route: IV, ONCE, Dosing Weight 58.636,

 kg, Start date: 16 20:15:00, Stop date: 16 20:15:00                
                                                Inactive                                        

                                                03/10/2016                            [facility]                    

 

                          Fentanyl                            50 microgram, Route: IV, ONCE, Dosing Weight 58.636,

 kg, Start date: 16 19:27:00, Stop date: 16 19:27:00                
                                                Inactive                                        

                                                03/10/2016                            [facility]                    

 

                          Fentanyl                            50 microgram, Route: IV, ONCE, Dosing Weight 58.636,

 kg, Start date: 16 18:55:00, Stop date: 16 18:55:00                
                                                Inactive                                        

                                                03/10/2016                            [facility]                    

 

                          Fentanyl                            25 microgram, Route: IV, ONCE, Dosing Weight 58.636,

 kg, Start date: 16 18:50:00, Stop date: 16 18:50:00                
                                                Inactive                                        

                                                03/10/2016                            [facility]                    

 

                          Ondansetron                            4 mg, 2 mL, Route: IVP, Drug form: INJ, ONCE,

 Dosing Weight 58.636, kg, PRN Nausea & Vomiting, Start date: 16 
18:02:00Notes: (Same as: Zofran)   *** MEDICATION WASTE *** Product Size:  4 mg 
Product Wasted:  _0_ mg                                                        No Longer

 Active                                                                              

                          03/10/2016                            [facility]                  

  

 

                          Flumazenil                            0.2 mg, 2 mL, Route: IVP, Drug form: INJ, PRN,

 Dosing Weight 58.636, kg, PRN Benzodiazepine Reversal, Initial dose, Start 
date: 16 18:02:00, Duration: 30 day, Stop date: 16 19:01:00Notes: 
(Same as: Romazicon)                                                        Inactive 

                                                                                   03/10/2016

                                        [facility]                    

 

                          Naloxone                            0.04 mg, 0.1 mL, Route: IVP, Drug form: INJ, Q2MIN,

 Dosing Weight 58.636, kg, PRN Narcotic Reversal, Start date: 16 18:02:00,
Duration: 8 doses or times, Stop date: Limited # of timesNotes: Same as Narcan  
                                                      No Longer Active                

                                                                            03/10/2016         

                                        [facility]                    

 

                          Fentanyl                            25 microgram, 0.5 mL, Route: IVP, Drug form: INJ,

 ONCE, Dosing Weight 58.636, kg, PRN Pain Score 4-6, Start date: 16 
18:02:00Notes: (Same as: Sublimaze)  Preservative free.                            

                            Inactive                                                 

                                                03/10/2016                            [facility]                    

 

                          Hydralazine                            10 mg, 0.5 mL, Route: IVP, Drug form: INJ, 

Q20Min, Dosing Weight 58.636, kg, PRN Elevated BP, Start date: 16 
18:02:00, Duration: 2 doses or times, Stop date: Limited # of timesNotes: (Same 
as: Apresoline) Push over 5 minutes                                                

                    No Longer Active                                                               

                          03/10/2016                            [facility]   

                 

 

                          gabapentin 100 MG Oral Capsule                            100 mg=1 cap, PO, TID, #

 90 cap, 1 Refill(s)                                                        Active   

                                                                                 2016

                                        [facility]                    

 

                          normal saline 0.9% IV 1,000 mL                            1,000 mL, Rate: 50 ml/hr,

 Infuse over: 20 hr, Route: IV, Dosing Weight 58.636 kg, Total Volume: 1,000, 
Start date: 16 11:24:00, Duration: 30 day, Stop date: 16 11:23:00   
                                                      No Longer Active                 

                                                                            2016          

                                        [facility]                    

 

                                        24 HR tramadol hydrochloride 100 MG Extended Release Tablet                     

                                        100 mg=1 tab, PO, Q8H, PRN Pain Score 6-10, # 24 tab, 0 Refill(s)           

                                                Active                                    

                                                10/12/2015                            [facility]                    

 

                          Carbidopa 25 MG / Levodopa 100 MG Oral Tablet                            1 tab, PO,

 QID, 0 Refill(s)                                                        Active      

                                                                              10/12/2015

                                        [facility]                    

 

                          carvedilol 12.5 MG Oral Tablet [Coreg]                            12.5 mg=1 tab, PO,

 BID, # 60 tab, 0 Refill(s)                                                        Active

                                                                                    10/12/2015

                                        [facility]                    

 

                          senna 8.6 mg oral tablet                            17.2 mg=2 tab, PO, Q12H, # 12 

tab, 0 Refill(s)                                                        Active       

                                                                             10/12/2015

                                        [facility]                    

 

                          phenytoin 100 mg oral capsule, extended release                            100 mg=1

 cap, PO, Q12H, PRN Pain Score 6-10, # 30 cap, 0 Refill(s)                      
                                                Active                                               

                                                10/12/2015                            [facility]                    

 

                          Nystatin 100 UNT/MG Topical Powder                            1 appl, TOP, BID, # 

15 gm, 0 Refill(s)                                                        Active     

                                                                               10/12/2015

                                        [facility]                    

 

                                        heparin sodium, porcine 2500 UNT/ML Injectable Solution                         

                          5000, SUB-Q, Q12H, # 1 IntlUnit, 0 Refill(s)                                      

                    Active                                                               

                          10/12/2015                            [facility]   

                 

 

                                        24 HR tramadol hydrochloride 100 MG Extended Release Tablet                     

                                        100 mg=1 tab, PO, ABXQ8H, PRN Pain Score 6-10, # 24 tab, 0 Refill(s)        

                                                Inactive                               

                                                      10/12/2015                      

                                        [facility]                    

 

                                        Acetaminophen 325 MG / Hydrocodone Bitartrate 7.5 MG Oral Tablet [Norco 7.5/325]

                                        1 tab, Route: PO, Drug Form: TAB, Dosing Weight 43.182,

 kg, Q6H, Start date: 10/11/15 12:00:00, Duration: 30 day, Stop date: 11/10/15 
6:00:00Notes: Same as Norco 325-7.5mg   Do not exceed 4gm/day of acetaminophen. 
                                                      No Longer Active               

                                                                            10/11/2015        

                                        [facility]                    

 

                                        heparin sodium, porcine 2500 UNT/ML Injectable Solution                         

                                        5,000 unit, 1 mL, Route: SUB-Q, Drug form: INJ, Q12H, Dosing Weight 43.182, kg, 

Start date: 10/11/15 12:00:00, Duration: 30 day, Stop date: 11/10/15 
0:00:00Notes: porcine heparin                                                        

No Longer Active                                                                     

                          10/11/2015                            [facility]         

           

 

                          Nystatin 100 UNT/MG Topical Powder                            1 appl, Route: TOP, 

BID, Drug form: PWDR, Start date: 10/10/15 9:00:00, Duration: 30 day, Stop date:
11/08/15 17:00:00Notes: (Same as:Mycostatin, Nilstat)  For external use only.   
                                                      No Longer Active                 

                                                                            10/10/2015          

                                        [facility]                    

 

                          Enoxaparin 20 mg (0.2 mL)                            Enoxaparin 20 mg (0.2 mL), 20

 mg, 0.2 mL, Drug form: MISC, Route: SUB-Q, [id number], 10/09/15 12:30:00, 
Duration: 30 day, Stop date: 11/08/15 0:30:00Notes: PLEASE NOTE: Using 
enoxaparin 30 mg syringe, draw up 0.2 mL and put in injection syringe under IV 
de leon in sterile conditions.                                                        No

 Longer Active                                                                       

                          10/09/2015                            [facility]           

         

 

                          Dextrose 50% Syringe                            12.5 gm, 25 mL, Route: IVP, Drug Form:

 INJ, Dosing Weight 43.182, kg, PRN, PRN Abnormal Lab Result, Start date: 
10/09/15 3:58:00, Duration: 30 day, Stop date: 11/08/15 2:57:00, For FSBG 40 
mg/dL - 60 mg/dLSpecial Instructions: For FSBG 40 mg/dL - 60 mg/dL              
                                                Inactive                                      

                                                10/09/2015                            [facility]                    

 

                          Insulin regular                            12 unit, 0.12 mL, Route: SUB-Q, Drug form:

 SOLN, PRN, Dosing Weight 43.182, kg, PRN Abnormal Lab Result, Start date: 
10/09/15 3:58:00, Duration: 30 day, Stop date: 11/08/15 2:57:00, For FSBG >=200 
mg/dLSpecial Instructions: For FSBG >=200 mg/dLNotes: (Same as: Humulin R) Roll 
in palms of hands gently;  Do not shake vigorously. "single patient use only"  
(Restricted to patients requiring a dose >  60 units)  Stable for 28 days at 
room temperature Expires in ______ days from ______________Date                 
                                                Inactive                                         

                                                10/09/2015                            [facility]                    

 

                          Senokot                            17.2 mg, 2 tab, Route: PO, Drug form: TAB, Q12H,

 Start date: 10/08/15 21:00:00, Duration: 30 day, Stop date: 11/07/15 
9:00:00Notes: (Same as: Senokot)                                                   

                    No Longer Active                                                                  

                          10/09/2015                            [facility]      

              

 

                          Phenytoin                            100 mg, 1 cap, Route: PO, Drug form: ERCAP, Q12H,

 Dosing Weight 43.182, kg, Start date: 10/08/15 21:00:00, Duration: 6 day, Stop 
date: 10/14/15 9:00:00Notes: (Same as: Dilantin)  Do not open, crush, or chew.  
                                                      No Longer Active                

                                                                            10/09/2015         

                                        [facility]                    

 

                          Ofirmev                            1,000 mg, 100 mL, Route: IV, Drug form: INJ, Q6H,

 Dosing Weight 43.182, kg, for > or=50 kg, Start date: 10/08/15 19:00:00, 
Duration: 30 day, Stop date: 11/07/15 18:00:00Notes: Infuse over 15 minutes  Do 
not exceed 4gm/day of acetaminophen    *** MEDICATION WASTE *** Product Size:  
1000 mg Product Wasted:  ___ mg                                                    

                    Inactive                                                                           

                          10/09/2015                            [facility]               

     

 

                          sennosides, USP                            1 tab, Route: PO, Dosing Weight 43.182,

 kg, BID, Start date: 10/08/15 17:00:00, Duration: 30 day, Stop date: 11/07/15 
9:00:00                                                        Inactive              

                                                                            10/08/2015       

                                        [facility]                    

 

                          Sodium Chloride 0.154 MEQ/ML Injectable Solution                            500 mL,

 500 ml/hr, Infuse Over: 1 hr, Route: IV, 500, Drug form: INJ, ONCE, Priority: 
STAT, Dosing Weight 43.182 kg, Start date: 10/08/15 15:09:00, Duration: 1 doses 
or times, Stop date: 10/08/15 15:09:00                                             

                    Inactive                                                                    

                          10/08/2015                            [facility]        

            

 

                          Acetaminophen                            650 mg, 2 tab, Route: PO, Drug form: TAB,

 Q6H, Dosing Weight 43.182, kg, Start date: 10/08/15 12:00:00, Duration: 30 day,
Stop date: 11/07/15 6:00:00Notes: Do not exceed 4 gm/day.  (Same as: Tylenol)   
                                                      No Longer Active                 

                                                                            10/08/2015          

                                        [facility]                    

 

                          Lovenox                            20 mg, 0.2 mL, Route: SUB-Q, Drug form: INJ, [id number],

 Dosing Weight 43.182, kg, Start date: 10/08/15 10:30:00, Duration: 30 day, Stop
date: 11/06/15 22:30:00Notes: (Same as: Lovenox)                                   

                          No Longer Active                                                

                                                10/08/2015                            [facility]                    

 

                          heparin                            5,000 unit, 1 mL, Route: SUB-Q, Drug form: INJ,

 Q12H, Start date: 10/08/15 10:30:00, Duration: 30 day, Stop date: 11/07/15 
9:00:00Notes: porcine heparin                                                        

Inactive                                                                             

                          10/08/2015                            [facility]                 

   

 

                          Oxycodone Hydrochloride 5 MG Oral Tablet                            5 mg, 1 tab, Route:

 PO, Drug form: TAB, Q6H, Dosing Weight 43.182, kg, PRN Pain Score 4-6, Start 
date: 10/08/15 9:40:00, Duration: 30 day, Stop date: 11/07/15 9:39:00Notes: 
(Same as: Roxicodone)                                                        No Longer

 Active                                                                              

                          10/08/2015                            [facility]                  

  

 

                                        pneumococcal capsular polysaccharide type 1 vaccine / pneumococcal capsular polysaccharide

 type 10A vaccine / pneumococcal capsular polysaccharide type 11A vaccine / 
pneumococcal capsular polysaccharide type 12F vaccine / pneumococcal capsular 
polysacchar                             0.5 mL, Route: IM, Drug Form: INJ, Daily, Start

 date: 10/08/15 9:00:00, Duration: 1 doses or times, Stop date: 10/08/15 
9:00:00Notes: (Same as: Pneumovax 23)  Refrigerate                                 

                       Inactive                                                        

                            10/08/2015                            [facility]

                    

 

                          influenza virus vaccine, inactivated                            0.5 mL, Route: IM,

 Drug Form: SUSP, Daily, Start date: 10/08/15 9:00:00, Duration: 1 doses or 
times, Stop date: 10/08/15 9:00:00Notes: (Same as: Fluzone Quadrivalent)  For 3 
years of age and older (0.5 mL IM) Shake well before use                        
                                                Inactive                                                

                                                10/08/2015                            [facility]                    

 

                          Lisinopril                            5 mg, 1 tab, Route: PO, Drug form: TAB, Daily,

 Dosing Weight 60, kg, Start date: 10/08/15 9:00:00, Duration: 30 day, Stop 
date: 11/06/15 9:00:00Notes: (Same as: Prinivil, Zestril)                       
                                                No Longer Active                                       

                                                10/08/2015                            [facility]                    

 

                          Furosemide 20 MG Oral Tablet [Lasix]                            20 mg, 1 tab, Route:

 PO, Drug form: TAB, Daily, Dosing Weight 60, kg, Start date: 10/08/15 9:00:00, 
Duration: 30 day, Stop date: 11/06/15 9:00:00Notes: (Same as: Lasix)  May cause 
GI upset.  Give with food or milk.                                                 

                    No Longer Active                                                                

                          10/08/2015                            [facility]    

                

 

                          Synthroid                            112 microgram, 1 tab, Route: PO, Drug form: TAB,

 Q630AM, Dosing Weight 60, kg, Start date: 10/08/15 6:30:00, Duration: 30 day, 
Stop date: 11/06/15 6:30:00Notes: Take 1 hour before or 2 hours after meal; 
Enteral feeds may interefere with the absorption of this medication.(Same 
as:Levothroid)                                                        No Longer Active

                                                                                    10/08/2015

                                        [facility]                    

 

                          Benadryl                            12.5 mg, 5 mL, Route: PO, Drug form: LIQ, TID,

 Dosing Weight 43.182, kg, PRN Itching, Start date: 10/08/15 1:48:00, Duration: 
30 day, Stop date: 11/07/15 1:47:00Notes: (Same as: Benadryl)                   
                                                No Longer Active                                  

                                                      10/08/2015                         

                                        [facility]                    

 

                          Magnesium Sulfate                            2 gm, 50 mL, Route: IVPB, Drug form: 

INJ, ONCE, Dosing Weight 43.182, kg, Total dose=2 gm, Start date: 10/08/15 
0:56:00, Duration: 1 doses or times, Stop date: 10/08/15 0:56:00                
                                                Inactive                                       

                                                10/08/2015                            [facility]                    

 

                          Lyrica                            25 mg, 1 cap, Route: PO, Drug form: CAP, Q8H, Dosing

 Weight 43.182, kg, Start date: 10/08/15 0:00:00, Duration: 30 day, Stop date: 
11/06/15 16:00:00Notes: (Same as: Lyrica)                                          

                    No Longer Active                                                         

                           10/08/2015                            [facility]

                    

 

                          Tramadol                            50 mg, 1 tab, Route: PO, Drug form: TAB, Q6H, 

Dosing Weight 43.182, kg, Start date: 10/08/15 0:00:00, Duration: 30 day, Stop 
date: 11/06/15 18:00:00Notes: Not to exceed 400mg/day. (Same As: Ultram)        
                                                      No Longer Active                     

                                                                            10/08/2015              

                                        [facility]                    

 

                          Oxycodone Hydrochloride 5 MG Oral Tablet                            5 mg, 1 tab, Route:

 PO, Drug form: TAB, Q6H, Dosing Weight 43.182, kg, Start date: 10/08/15 
0:00:00, Duration: 30 day, Stop date: 11/06/15 18:00:00Notes: (Same as: 
Roxicodone)                                                        Inactive          

                                                                            10/08/2015   

                                        [facility]                    

 

                          Docusate                            100 mg, 1 cap, Route: NG, Drug form: CAP, Q12H,

 Dosing Weight 60, kg, Start date: 10/07/15 21:00:00, Stop date: 11/06/15 
9:00:00Notes: (Same as: Colace) (Do Not Crush)                                     

                          No Longer Active                                                  

                                                10/08/2015                            [facility]                    

 

                          PlasmaLyte A PH-7.4 (Bolus) IV                            500 mL, 500 ml/hr, Route:

 IV, Drug Form: INJ, Dosing Weight 43.182, kg, ONCE, Start date: 10/07/15 
20:40:00, Stop date: 10/07/15 20:40:00                                             

                    Inactive                                                                    

                          10/08/2015                            [facility]        

            

 

                          Phenytoin                            100 mg, 2 mL, Route: IV, Drug form: INJ, Q8H,

 Dosing Weight 60, kg, Start date: 10/07/15 19:00:00, Duration: 30 day, Stop 
date: 11/06/15 16:00:00Notes: (Same as: Dilantin)   Do not infuse greater than 5
0 mg/min.    *** MEDICATION WASTE *** Product Size:  100 mg Product Wasted:  ___
 mg                                                        No Longer Active          

                                                                            10/08/2015   

                                        [facility]                    

 

                          Pramipexole                            0.25 mg, 1 tab, Route: PO, Drug form: TAB, 

TID, Dosing Weight 60, kg, Start date: 10/07/15 17:00:00, Duration: 30 day, Stop
 date: 11/06/15 13:00:00Notes: (Same as: Mirapex)                                  

                          No Longer Active                                               

                                                10/07/2015                            [facility]                    

 

                          Carbidopa 25 MG / Levodopa 100 MG Oral Tablet                            1 tab, Route:

 PO, Drug Form: TAB, Dosing Weight 60, kg, QID, Start date: 10/07/15 17:00:00, 
Duration: 30 day, Stop date: 11/06/15 13:00:00Notes: Take with milk or food. 
(Same As: Sinemet)                                                        No Longer Active

                                                                                    10/07/2015

                                        [facility]                    

 

                          Tylenol                            650 mg, 2 tab, Route: PO, Drug form: TAB, Q6H, 

Dosing Weight 43.182, kg, PRN For Temp > 100.4 F, Start date: 10/07/15 16:27:00,
 Duration: 30 day, Stop date: 11/06/15 16:26:00Notes: Do not exceed 4 gm/day.  
(Same as: Tylenol)                                                        Inactive   

                                                                                 10/07/2015

                                        [facility]                    

 

                          Benadryl                            12.5 mg, 0.25 mL, Route: IV, Drug form: INJ, ONCE,

 Dosing Weight 60, kg, Start date: 10/07/15 14:46:00, Stop date: 10/07/15 
14:46:00Notes: (Same as: Benadryl)                                                 

                    Inactive                                                                        

                          10/07/2015                            [facility]            

        

 

                          Spironolactone 25 MG Oral Tablet [Aldactone]                            25 mg=1 tab,

 PO, PRN, # 60 tab, 0 Refill(s)                                                    

                    Active                                                                             

                          10/07/2015                            [facility]                 

   

 

                          lisinopril 5 mg oral tablet                            5 mg=1 tab, PO, Daily, # 30

 tab, 0 Refill(s)                                                        No Longer Active

                                                                                    10/07/2015

                                        [facility]                    

 

                          rivaroxaban 15 MG Oral Tablet [Xarelto]                            15 mg=1 tab, PO,

 Daily, 3 Refill(s)                                                        No Longer 

Active                                                                               

                          10/07/2015                            [facility]                   

 

 

                                        Acetaminophen 325 MG / Hydrocodone Bitartrate 10 MG Oral Tablet [Norco 10/325]  

                                        1 tab, PO, Q6H, PRN for pain, 0 Refill(s)                

                                                No Longer Active                               

                                                      10/07/2015                      

                                        [facility]                    

 

                          Levothyroxine Sodium 0.112 MG Oral Tablet [Synthroid]                            112

 microgram=1 tab, PO, Daily, # 30 tab, 0 Refill(s)                                 

                       Active                                                          

                          10/07/2015                            [facility]

                    

 

                          ALPRAZOLam 0.25 mg oral tablet, disintegrating                            0.25 mg=1

 tab, PO, Q12H, PRN Anxiety, 0 Refill(s)                                           

                    Active                                                                    

                          10/07/2015                            [facility]        

            

 

                          Potassium Chloride 20 MEQ Extended Release Tablet                            20 mEq=1

 tab, PO, Daily, # 30 tab, 3 Refill(s)                                             

                    Active                                                                      

                          10/07/2015                            [facility]          

          

 

                          Carbidopa 25 MG / Levodopa 100 MG Oral Tablet                            1 tab, PO,

 QID, # 120 tab, 0 Refill(s)                                                        Active

                                                                                    10/07/2015

                                        [facility]                    

 

                          Furosemide 20 MG Oral Tablet [Lasix]                            20 mg=1 tab, PO, Daily,

 # 30 tab, 0 Refill(s)                                                        Active 

                                                                                   10/07/2015

                                        [facility]                    

 

                          pramipexole 0.25 mg oral tablet                            0.25 mg=1 tab, PO, TID,

 # 90 tab, 0 Refill(s)                                                        Active 

                                                                                   10/07/2015

                                        [facility]                    

 

                                        Factor 2-7-9-10 Prothrombin Complex Concentrate 2,208 unit + IV bag 1 ea        

                                        Route: IV, Drug form: INJ, ONCE, Dosing Weight 60, kg, ; Use 100kg

 max dosing weight for pt >100kg. Max. cumulative dose=50 units/kg/day., 
Priority: STAT, Start date: 10/07/15 11:16:00, Stop date: 10/07/15 
11:16:00Notes: Same as: Kcentra  Maximum dose=5000 units; Round down dose to the
 nearest vial size   Hematology clinical pharmacist consult required            
                                                Inactive                                   

                                                 10/07/2015                            

[facility]                    

 

                          fosphenytoin                            1,200 mg, Route: IVPB, ONCE, Dosing Weight

 60, kg, Priority: STAT, Start date: 10/07/15 10:51:00, Stop date: 10/07/15 
10:51:00                                                        Inactive             

                                                                            10/07/2015      

                                        [facility]                    

 

                                        Factor 2-7-9-10 Prothrombin Complex Concentrate 2,208 unit + IV bag 1 ea        

                                        Route: IV, Drug form: INJ, ONCE, Dosing Weight 60, kg, ; Use 100kg

 max dosing weight for pt >100kg. Max. cumulative dose=50 units/kg/day., 
Priority: STAT, Start date: 10/07/15 10:49:00, Stop date: 10/07/15 
10:49:00Notes: Same as: Kcentra  Maximum dose=5000 units; Round down dose to the
 nearest vial size   Hematology clinical pharmacist consult required            
                                                Inactive                                   

                                                 10/07/2015                            

[facility]                    

 

                          Zofran                            4 mg, Route: IVP, Drug form: INJ, ONCE, kg, Priority:

 STAT, Start date: 10/07/15 10:08:00, Stop date: 10/07/15 10:08:00              
                                                Inactive                                     

                                                10/07/2015                            [facility]                    

 

                          Morphine                            4 mg, Route: IVP, Drug form: INJ, ONCE, kg, Priority:

 STAT, Start date: 10/07/15 10:08:00, Stop date: 10/07/15 10:08:00              
                                                Inactive                                     

                                                10/07/2015                            [facility]                    

 

                          iodixanol                            90 mL, Route: IVP, Drug Form: SOLN, kg, ONCALL,

 STAT, Start date: 10/07/15 10:03:00, Duration: 1 doses or times, Dose=2.2ml/kg,
  Max dose=100ml -- "To be infused by Radiology Staff ONLY"Special Instructions:
 Dose=2.2ml/kg,  Max dose=100ml -- "To be infused by Radiology Staff ONLY"      
                                                  Inactive                             

                                                       10/07/2015                    

                                        [facility]                    



                                                                                
                                                                                
                                                                                
                                                                                
                                                                                
                                                                                
                                                                                
                                                                                
                                                                                
                                                                                
                                                                                
                                                                                
                                                                                
                                                                                
                                                                                
                                                                                
                                                                                
                                                                        



Allergies, Adverse Reactions, Alerts

                    





                    Substance                            Category                            Reaction   

                          Severity                            Reaction type           

                          Status                            Date Reported                     

                          Comments                            Source                    

 

                          morphine<sup>1</sup>                            Assertion                         

                                                                            Drug allergy                

                    Active                                                        Pt had a generalized

 rash on 10/7                             [facility]                    



                                                                        



Immunizations

                    





                    Immunization                            Date Given                            Site  

                          Status                            Last Updated             

                          Comments                            Source                    

 

                          influenza virus vaccine, inactivated                            10/08/2015        

                                                Not Given                            [facility]

                                                        [facility], [facility]                    

 

                          pneumococcal 23-valent vaccine                            10/08/2015              

                                                Not Given                            [facility]  

                                                      [facility]                    



                                                                                
                        



Results

                    





                    Order Name                            Results                            Value      

                          Reference Range                            Date                

                          Interpretation                            Comments                       

                                        Source                    

 

                    HEMATOLOGY                            Eosinophils                            4.3 %  

                           0.0 - 4.0                            03/10/2016           

                                                                            [facility]                    

 

                    HEMATOLOGY                            Monocytes                            6.2 %    

                          2.0 - 12.0                            03/10/2016            

                                                                            [facility]                    

 

                    HEMATOLOGY                            Lymphocytes                            4.0 %  

                           20.0 - 40.0                            03/10/2016         

                                                                            [facility]                    

 

                    HEMATOLOGY                            Basophils                            0.3 %    

                          0.0 - 1.0                            03/10/2016             

                                                                            [facility]                    

 

                    HEMATOLOGY                            Monocytes #                            0.9 K/CMM

                             0.0 - 0.8                            03/10/2016         

                                                                            [facility]                    

 

                    HEMATOLOGY                            Lymphocytes #                            0.6 K/CMM

                             1.0 - 5.5                            03/10/2016         

                                                                            [facility]                    

 

                    HEMATOLOGY                            Segs-Bands #                            12.1 K/CMM

                             1.5 - 8.1                            03/10/2016         

                                                                            [facility]                    

 

                    HEMATOLOGY                            Segs                            85.2 %        

                          45.0 - 75.0                            03/10/2016               

                                                                            [facility]

                    

 

                    HEMATOLOGY                            Plt Morph                            Normal 

                    (3/10/16 2:00 PM)                                                          03/10/2016

                                                                                    [facility]                    

 

                    HEMATOLOGY                            Eosinophils #                            0.6 K/CMM

                             0.0 - 0.5                            03/10/2016         

                                                                            [facility]                    

 

                    HEMATOLOGY                            RBC                            4.80 M/CMM     

                          4.20 - 5.40                            03/10/2016            

                                                                            [facility]                    

 

                    HEMATOLOGY                            WBC                            14.2 K/CMM     

                          3.7 - 10.4                            03/10/2016             

                                                                            [facility]                    

 

                    HEMATOLOGY                            Hgb                            12.1 g/dL      

                          12.0 - 16.0                            03/10/2016             

                                                                            [facility]                    

 

                    HEMATOLOGY                            Hct                            39.4 %         

                          36.0 - 48.0                            03/10/2016                

                                                                            [facility]

                    

 

                    HEMATOLOGY                            MCH                            25.2 pg        

                          27.0 - 31.0                            03/10/2016               

                                                                            [facility]

                    

 

                    HEMATOLOGY                            MCV                            82.2 fL        

                          80.0 - 98.0                            03/10/2016               

                                                                            [facility]

                    

 

                    HEMATOLOGY                            RDW                            14.1 %         

                          11.5 - 14.5                            03/10/2016                

                                                                            [facility]

                    

 

                    HEMATOLOGY                            MCHC                            30.7 g/dL     

                          32.0 - 36.0                            03/10/2016            

                                                                            [facility]                    

 

                    HEMATOLOGY                            Platelet                            240 K/CMM 

                            133 - 450                            03/10/2016          

                                                                            [facility]                    

 

                    HEMATOLOGY                            MPV                            9.1 fL         

                          7.4 - 10.4                            03/10/2016                 

                                                                            [facility]

                    

 

                    CHEM PANEL                            Phosphorus                            3.1 mg/dL

                             2.5 - 4.5                            03/10/2016         

                                                                            [facility]                    

 

                    CHEM PANEL                            Magnesium Lvl                            1.7 mg/dL

                             1.8 - 2.4                            03/10/2016         

                                                                            [facility]                    

 

                    ELECTROLYTES                            AGAP                            13.9 meq/L  

                           10.0 - 20.0                            03/10/2016         

                                                                            [facility]                    

 

                    ELECTROLYTES                            eGFR                            56 mL/min/1.73m2

                                                         03/10/2016                  

                                                      Result Comment: The eGFR is calculated using the

 CKD-EPI formula. In most young, healthy individuals the eGFR will be >90 
mL/min/1.73m2. The eGFR declines with age. An eGFR of 60-89 may be normal in 
some populations, particularly the elderly, for whom the CKD-EPI formula has not
 been extensively validated. Use of the eGFR is not recommended in the following
 populations:



Individuals with unstable creatinine concentrations, including pregnant patients
 and those with serious co-morbid conditions.



Patients with extremes in muscle mass or diet. 



The data above are obtained from the National Kid[employer] Education Program 
(NKDEP) which additionally recommends that when the eGFR is used in patients 
with extremes of body mass index for purposes of drug dosing, the eGFR should be
 multiplied by the estimated BMI.                            [facility]

                    

 

                    ELECTROLYTES                            Calcium Lvl                            8.9 mg/dL

                             8.5 - 10.5                            03/10/2016        

                                                                            [facility]                    

 

                    ELECTROLYTES                            Creatinine Lvl                            0.93

 mg/dL                             0.50 - 1.40                            03/10/2016 

                                                                                   [facility]                    

 

                    ELECTROLYTES                            Glucose Lvl                            122 mg/dL

                             70 - 99                            03/10/2016           

                                                                            [facility]                    

 

                    ELECTROLYTES                            BUN                            19 mg/dL     

                          7 - 22                            03/10/2016                 

                                                                            [facility]

                    

 

                    ELECTROLYTES                            Chloride Lvl                            108 

meq/L                             95 - 109                            03/10/2016     

                                                                               [facility]                    

 

                    ELECTROLYTES                            Potassium Lvl                            3.9

 meq/L                             3.5 - 5.1                            03/10/2016   

                                                                                 [facility]                    

 

                    ELECTROLYTES                            Sodium Lvl                            141 meq/L

                             135 - 145                            03/10/2016         

                                                                            [facility]                    

 

                    ELECTROLYTES                            CO2                            23 meq/L     

                          24 - 32                            03/10/2016                

                                                                            [facility]

                    

 

                    HEMATOLOGY                            Lymphocytes                            1.0 %  

                           20.0 - 40.0                            03/10/2016         

                                                                            [facility]                    

 

                    HEMATOLOGY                            Atypical Lymphs                            0.0

 %                             <=0.0 %                            03/10/2016         

                                                                            [facility]                    

 

                    HEMATOLOGY                            Plt Morph                            Normal 

                    (3/10/16 5:26 AM)                                                          03/10/2016

                                                                                    [facility]                    

 

                    HEMATOLOGY                            Monocytes                            3.0 %    

                          2.0 - 12.0                            03/10/2016            

                                                                            [facility]                    

 

                    HEMATOLOGY                            RBC Morph                            Normal 

                    (3/10/16 5:26 AM)                                                          03/10/2016

                                                                                    [facility]                    

 

                    HEMATOLOGY                            Segs-Bands #                            18.7 K/CMM

                             1.5 - 8.1                            03/10/2016         

                                                                            [facility]                    

 

                    HEMATOLOGY                            Lymphocytes #                            0.2 K/CMM

                             1.0 - 5.5                            03/10/2016         

                                                                            [facility]                    

 

                    HEMATOLOGY                            Monocytes #                            0.6 K/CMM

                             0.0 - 0.8                            03/10/2016         

                                                                            [facility]                    

 

                    HEMATOLOGY                            Segs                            54.0 %        

                          45.0 - 75.0                            03/10/2016               

                                                                            [facility]

                    

 

                    HEMATOLOGY                            Bands                            42.0 %       

                          0.0 - 11.0                            03/10/2016               

                                                                            [facility]

                    

 

                    HEMATOLOGY                            PTT                            33.4 s         

                          22.9 - 35.8                            03/10/2016                

                                                                            [facility]

                    

 

                    HEMATOLOGY                            INR                            1.86           

                          0.85 - 1.17                            03/10/2016                 

                                                                            [facility]

                    

 

                    HEMATOLOGY                            PT                            21.8 s          

                          12.0 - 14.7                            03/10/2016                 

                                                                            [facility]

                    

 

                    HEMATOLOGY                            Platelet                            234 K/CMM 

                            133 - 450                            03/10/2016          

                                                                            [facility]                    

 

                    HEMATOLOGY                            Hct                            40.0 %         

                          36.0 - 48.0                            03/10/2016                

                                                                            [facility]

                    

 

                    HEMATOLOGY                            MPV                            9.1 fL         

                          7.4 - 10.4                            03/10/2016                 

                                                                            [facility]

                    

 

                    HEMATOLOGY                            MCH                            26.5 pg        

                          27.0 - 31.0                            03/10/2016               

                                                                            [facility]

                    

 

                    HEMATOLOGY                            MCV                            82.9 fL        

                          80.0 - 98.0                            03/10/2016               

                                                                            [facility]

                    

 

                    HEMATOLOGY                            RDW                            14.2 %         

                          11.5 - 14.5                            03/10/2016                

                                                                            [facility]

                    

 

                    HEMATOLOGY                            MCHC                            31.9 g/dL     

                          32.0 - 36.0                            03/10/2016            

                                                                            [facility]                    

 

                    HEMATOLOGY                            RBC                            4.83 M/CMM     

                          4.20 - 5.40                            03/10/2016            

                                                                            [facility]                    

 

                    HEMATOLOGY                            Hgb                            12.8 g/dL      

                          12.0 - 16.0                            03/10/2016             

                                                                            [facility]                    

 

                    HEMATOLOGY                            WBC X 10x3                            19.5 K/CMM

                             3.7 - 10.4                            03/10/2016        

                                                                            [facility]                    

 

                          BLOOD BANK RESULTS                            Antibody Scrn                       

                                        Negative 

                    (3/9/16 11:28 AM)                                                          2016

                                                                                    [facility]                    

 

                    BLOOD BANK RESULTS                            ABO/Rh                            A POS

                                                          2016                 

                                                                            [facility]

                    

 

                    CHEM PANEL                            B/C Ratio                            24       

                          6 - 25                            2016                  

                                                                            [facility]

                    

 

                    CHEM PANEL                            AGAP                            13.9 meq/L    

                          10.0 - 20.0                            2016           

                                                                            [facility]                    

 

                    CHEM PANEL                            Globulin                            4.0 g/dL  

                           2.0 - 4.0                            2016           

                                                                            [facility]                    

 

                    CHEM PANEL                            A/G Ratio                            0.8      

                          0.7 - 1.6                            2016              

                                                                            [facility]                    

 

                    CHEM PANEL                            eGFR                            72 mL/min/1.73m2

                                                         2016                  

                                                      Result Comment: The eGFR is calculated using the

 CKD-EPI formula. In most young, healthy individuals the eGFR will be >90 
mL/min/1.73m2. The eGFR declines with age. An eGFR of 60-89 may be normal in 
some populations, particularly the elderly, for whom the CKD-EPI formula has not
 been extensively validated. Use of the eGFR is not recommended in the following
 populations:



Individuals with unstable creatinine concentrations, including pregnant patients
 and those with serious co-morbid conditions.



Patients with extremes in muscle mass or diet. 



The data above are obtained from the National Kid[employer] Education Program 
(NKDEP) which additionally recommends that when the eGFR is used in patients 
with extremes of body mass index for purposes of drug dosing, the eGFR should be
 multiplied by the estimated BMI.                            [facility]

                    

 

                    CHEM PANEL                            Calcium Lvl                            9.6 mg/dL

                             8.5 - 10.5                            2016        

                                                                            [facility]                    

 

                    CHEM PANEL                            CO2                            27 meq/L       

                          24 - 32                            2016                  

                                                                            [facility]

                    

 

                    CHEM PANEL                            Chloride Lvl                            103 meq/L

                             95 - 109                            2016          

                                                                            [facility]                    

 

                    CHEM PANEL                            Potassium Lvl                            3.9 meq/L

                             3.5 - 5.1                            2016         

                                                                            [facility]                    

 

                    CHEM PANEL                            Sodium Lvl                            140 meq/L

                             135 - 145                            2016         

                                                                            [facility]                    

 

                    CHEM PANEL                            Creatinine Lvl                            0.76

 mg/dL                             0.50 - 1.40                            2016 

                                                                                   [facility]                    

 

                    CHEM PANEL                            BUN                            18 mg/dL       

                          7 - 22                            2016                   

                                                                            [facility]

                    

 

                    CHEM PANEL                            Glucose Lvl                            95 mg/dL

                             70 - 99                            2016           

                                                                            [facility]                    

 

                    CHEM PANEL                            AST                            13 unit/L      

                          0 - 37                            2016                  

                                                                            [facility]

                    

 

                    CHEM PANEL                            Alk Phos                            102 unit/L

                             39 - 136                            2016          

                                                                            [facility]                    

 

                    CHEM PANEL                            ALT                            7 unit/L       

                          0 - 65                            2016                   

                                                                            [facility]

                    

 

                    CHEM PANEL                            Albumin Lvl                            3.4 g/dL

                             3.5 - 5.0                            2016         

                                                                            [facility]                    

 

                    CHEM PANEL                            Total Protein                            7.4 g/dL

                             6.4 - 8.4                            2016         

                                                                            [facility]                    

 

                    CHEM PANEL                            Bili Total                            0.5 mg/dL

                             0.2 - 1.3                            2016         

                                                                            [facility]                    

 

                    CHEM PANEL                            Magnesium Lvl                            2.0 mg/dL

                             1.8 - 2.4                            2016         

                                                                            [facility]                    

 

                    HEMATOLOGY                            MPV                            8.8 fL         

                          7.4 - 10.4                            2016                 

                                                                            [facility]

                    

 

                    HEMATOLOGY                            Platelet                            257 K/CMM 

                            133 - 450                            2016          

                                                                            [facility]                    

 

                    HEMATOLOGY                            MCH                            25.8 pg        

                          27.0 - 31.0                            2016               

                                                                            [facility]

                    

 

                    HEMATOLOGY                            RDW                            14.3 %         

                          11.5 - 14.5                            2016                

                                                                            [facility]

                    

 

                    HEMATOLOGY                            MCHC                            31.2 g/dL     

                          32.0 - 36.0                            2016            

                                                                            [facility]                    

 

                    HEMATOLOGY                            WBC                            9.5 K/CMM      

                          3.7 - 10.4                            2016              

                                                                            [facility]                    

 

                    HEMATOLOGY                            RBC                            4.83 M/CMM     

                          4.20 - 5.40                            2016            

                                                                            [facility]                    

 

                    HEMATOLOGY                            Hgb                            12.4 g/dL      

                          12.0 - 16.0                            2016             

                                                                            [facility]                    

 

                    HEMATOLOGY                            Hct                            39.8 %         

                          36.0 - 48.0                            2016                

                                                                            [facility]

                    

 

                    HEMATOLOGY                            MCV                            82.5 fL        

                          80.0 - 98.0                            2016               

                                                                            [facility]

                    

 

                    HEMATOLOGY                            INR                            1.91           

                          0.85 - 1.17                            2016                 

                                                                            [facility]

                    

 

                    HEMATOLOGY                            PT                            22.2 s          

                          12.0 - 14.7                            2016                 

                                                                            [facility]

                    

 

                    HEMATOLOGY                            PTT                            38.0 s         

                          22.9 - 35.8                            2016                

                                                                            [facility]

                    

 

                    HEMATOLOGY                            Monocytes #                            1.5 K/CMM

                             0.0 - 0.8                            2016         

                                                                            [facility]                    

 

                    HEMATOLOGY                            Eosinophils #                            0.4 K/CMM

                             0.0 - 0.5                            2016         

                                                                            [facility]                    

 

                    HEMATOLOGY                            Lymphocytes #                            1.5 K/CMM

                             1.0 - 5.5                            2016         

                                                                            [facility]                    

 

                    HEMATOLOGY                            Segs-Bands #                            6.1 K/CMM

                             1.5 - 8.1                            2016         

                                                                            [facility]                    

 

                    HEMATOLOGY                            Segs                            63.8 %        

                          45.0 - 75.0                            2016               

                                                                            [facility]

                    

 

                    HEMATOLOGY                            Lymphocytes                            16.0 % 

                            20.0 - 40.0                            2016        

                                                                            [facility]                    

 

                    HEMATOLOGY                            Monocytes                            16.3 %   

                          2.0 - 12.0                            2016           

                                                                            [facility]                    

 

                    HEMATOLOGY                            Eosinophils                            3.9 %  

                           0.0 - 4.0                            2016           

                                                                            [facility]                    

 

                    HEMATOLOGY                            Basophils #                            0.1 K/CMM

                             0.0 - 0.2                            2016         

                                                                            [facility]                    

 

                    HEMATOLOGY                            Basophils                            0.9 %    

                          0.0 - 1.0                            2016             

                                                                            [facility]                    

 

                          BLOOD BANK RESULTS                            RBC product                         

                                        Product available 

                    (3/9/16 11:22 AM)                                                          2016

                                                                                    [facility]                    

 

                          Hand 3 views DX                            Hand 3 views DX                        

                                        EXAM: RIGHT HAND 3 VIEWS

 

DATE: Oct 19, 2015 03:57:00 PM

 

INDICATION: M79.641   Pain in right hand 

 

COMPARISON: Right hand series 10/8/2015

 

TECHNIQUE:   PA, lateral and oblique radiographs of the right hand

 

FINDINGS:  There is generalized diminished bone mineral density. There is 
unchanged alignment with early fracture modeling of the mildly displaced 
comminuted third metacarpal shaft fracture. Osteoarthrosis of the hand most 
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

 

IMPRESSION:  

 

Unchanged alignment with early healing of mildly displaced comminuted third 
metacarpal shaft fracture.

                                                          10/19/2015                 

                                                      -

                                        -





Read by:  Robert Flynn MD

Dictated Date/time:  10/19/15 16:13

Electronically Signed by:  Robert Flynn MD                10/19/15 
16:51

FINAL REPORT

                                         STEVIE Sanchezann                    

 

                    Chest 1view DX                            Chest 1view DX                            

EXAM: XR CHEST 1 VIEW

 

INDICATION: Shallow breathing. 

 

COMPARISON: Chest Radiograph performed on 10/8/2015 at 0255 hours.

   

TECHNIQUE: Single AP radiograph of the chest.

 

IMPRESSION: 

 

No significant interval detrimental change from prior radiograph. 

 

Stable cardiomediastinal silhouette and cardiac pacemaker. Lungs are clear.

 

No consolidation or effusion.  Unchanged right distal clavicular fracture and 
bilateral rib fractures.

                                                          10/10/2015                 

                                                      -

                                        -





Read by:  Balwinder De Paz MD

Dictated Date/time:  10/11/15 08:27

Electronically Signed by:  Balwinder De Paz MD                  10/11/15 
08:30

FINAL REPORT

                                        [facility]                    

 

                    CHEM PANEL                            Magnesium Lvl                            2.2 mg/dL

                             1.8 - 2.4                            10/09/2015         

                                                                            [facility]                    

 

                    CHEM PANEL                            Phosphorus                            2.2 mg/dL

                             2.5 - 4.5                            10/09/2015         

                                                                            [facility]                    

 

                    ELECTROLYTES                            AGAP                            11.7 meq/L  

                           10.0 - 20.0                            10/09/2015         

                                                                            [facility]                    

 

                    ELECTROLYTES                            eGFR                            59 mL/min/1.73m2

                                                         10/09/2015                  

                                                      Result Comment: The eGFR is calculated using the

 CKD-EPI formula. In most young, healthy individuals the eGFR will be >90 
mL/min/1.73m2. The eGFR declines with age. An eGFR of 60-89 may be normal in 
some populations, particularly the elderly, for whom the CKD-EPI formula has not
 been extensively validated. Use of the eGFR is not recommended in the following
 populations:



Individuals with unstable creatinine concentrations, including pregnant patients
 and those with serious co-morbid conditions.



Patients with extremes in muscle mass or diet. 



The data above are obtained from the National Kid[employer] Disease Education Program 
(NKDEP) which additionally recommends that when the eGFR is used in patients 
with extremes of body mass index for purposes of drug dosing, the eGFR should be
 multiplied by the estimated BMI.                            [facility]

                    

 

                    ELECTROLYTES                            Potassium Lvl                            3.7

 meq/L                             3.5 - 5.1                            10/09/2015   

                                                                                 [facility]                    

 

                    ELECTROLYTES                            Chloride Lvl                            99 meq/L

                             95 - 109                            10/09/2015          

                                                                            [facility]                    

 

                    ELECTROLYTES                            CO2                            29 meq/L     

                          24 - 32                            10/09/2015                

                                                                            [facility]

                    

 

                    ELECTROLYTES                            Calcium Lvl                            8.6 mg/dL

                             8.5 - 10.5                            10/09/2015        

                                                                            [facility]                    

 

                    ELECTROLYTES                            Glucose Lvl                            99 mg/dL

                             70 - 99                            10/09/2015           

                                                                            [facility]                    

 

                    ELECTROLYTES                            BUN                            19 mg/dL     

                          7 - 22                            10/09/2015                 

                                                                            [facility]

                    

 

                    ELECTROLYTES                            Sodium Lvl                            136 meq/L

                             135 - 145                            10/09/2015         

                                                                            [facility]                    

 

                    ELECTROLYTES                            Creatinine Lvl                            0.9

 mg/dL                             0.5 - 1.4                            10/09/2015   

                                                                                 [facility]                    

 

                    HEMATOLOGY                            MPV                            9.1 fL         

                          7.4 - 10.4                            10/09/2015                 

                                                                            [facility]

                    

 

                    HEMATOLOGY                            Platelet                            193 K/CMM 

                            133 - 450                            10/09/2015          

                                                                            [facility]                    

 

                    HEMATOLOGY                            RDW                            15.5 %         

                          11.5 - 14.5                            10/09/2015                

                                                                            [facility]

                    

 

                    HEMATOLOGY                            MCHC                            32.1 g/dL     

                          32.0 - 36.0                            10/09/2015            

                                                                            [facility]                    

 

                    HEMATOLOGY                            WBC                            8.6 K/CMM      

                          3.7 - 10.4                            10/09/2015              

                                                                            [facility]                    

 

                    HEMATOLOGY                            MCH                            25.7 pg        

                          27.0 - 31.0                            10/09/2015               

                                                                            [facility]

                    

 

                    HEMATOLOGY                            Hgb                            11.5 g/dL      

                          12.0 - 16.0                            10/09/2015             

                                                                            [facility]                    

 

                    HEMATOLOGY                            Hct                            35.7 %         

                          36.0 - 48.0                            10/09/2015                

                                                                            [facility]

                    

 

                    HEMATOLOGY                            RBC                            4.46 M/CMM     

                          4.20 - 5.40                            10/09/2015            

                                                                            [facility]                    

 

                    HEMATOLOGY                            MCV                            79.9 fL        

                          80.0 - 98.0                            10/09/2015               

                                                                            [facility]

                    

 

                    HEMATOLOGY                            Microcyte                            1+ 

*ABN*

                    (10/9/15 3:44 AM)                              None Seen                            

10/09/2015                                                                           

                                        [facility]                    

 

                    HEMATOLOGY                            Eosinophils #                            0.9 K/CMM

                             0.0 - 0.5                            10/09/2015         

                                                                            [facility]                    

 

                    HEMATOLOGY                            Segs                            68.1 %        

                          45.0 - 75.0                            10/09/2015               

                                                                            [facility]

                    

 

                    HEMATOLOGY                            Lymphocytes #                            0.9 K/CMM

                             1.0 - 5.5                            10/09/2015         

                                                                            [facility]                    

 

                    HEMATOLOGY                            Monocytes #                            0.9 K/CMM

                             0.0 - 0.8                            10/09/2015         

                                                                            [facility]                    

 

                    HEMATOLOGY                            Monocytes                            10.3 %   

                          2.0 - 12.0                            10/09/2015           

                                                                            [facility]                    

 

                    HEMATOLOGY                            Eosinophils                            10.8 % 

                            0.0 - 4.0                            10/09/2015          

                                                                            [facility]                    

 

                    HEMATOLOGY                            Lymphocytes                            10.6 % 

                            20.0 - 40.0                            10/09/2015        

                                                                            [facility]                    

 

                    HEMATOLOGY                            Basophils                            0.2 %    

                          0.0 - 1.0                            10/09/2015             

                                                                            [facility]                    

 

                    HEMATOLOGY                            Segs-Bands #                            5.7 K/CMM

                             1.5 - 8.1                            10/09/2015         

                                                                            [facility]                    

 

                          PARATHYROID PROFILE                            Ca Norm WB                         

                    1.12 mMol/L                             1.05 - 1.25                            10/09/2015

                                                                                    [facility]                    

 

                    PARATHYROID PROFILE                            Ca Ion WB                            

1.08 mMol/L                             1.05 - 1.25                            10/09/2015

                                                                                    [facility]                    

 

                    HEMATOLOGY                            PTT                            33.8 s         

                          22.9 - 35.8                            10/08/2015                

                                                                            [facility]

                    

 

                    HEMATOLOGY                            PT                            16.1 s          

                          12.0 - 14.7                            10/08/2015                 

                                                                            [facility]

                    

 

                    HEMATOLOGY                            INR                            1.26           

                          0.85 - 1.17                            10/08/2015                 

                                                                            [facility]

                    

 

                    Clavicle DX                            Clavicle DX                            EXAM: 

XR RIGHT CLAVICLE 2 VIEWS

 

DATE: 10/8/2015 at 0843 hours

 

INDICATION: Fracture, upright views

 

COMPARISON: 10/7/2015 at 1510 hours.

 

TECHNIQUE: Upright AP and axial views of the right clavicle

 

FINDINGS: A comminuted fracture of the right distal clavicle is again 
identified, with mild superior displacement of the lateral fragment, not 
significantly changed in the upright position. The acromioclavicular joint is 
not widened. No new or superimposed bony abnormality is identified. 

 

IMPRESSION:  Comminuted distal clavicle fracture, with superior displacement of 
the lateral fragment, not significantly changed in the upright position.

                                                          10/08/2015                 

                                                      -

                                        -





Read by:  Arabella Marlow MD

Dictated Date/time:  10/08/15 09:19

Electronically Signed by:  Arabella Marlow MD                     10/08/15 
09:22

FINAL REPORT

                                        [facility]                    

 

                    CHEM PANEL                            Magnesium Lvl                            1.8 mg/dL

                             1.8 - 2.4                            10/08/2015         

                                                                            [facility]                    

 

                    CHEM PANEL                            Phosphorus                            4.1 mg/dL

                             2.5 - 4.5                            10/08/2015         

                                                                            [facility]                    

 

                    ELECTROLYTES                            AGAP                            11.7 meq/L  

                           10.0 - 20.0                            10/08/2015         

                                                                            [facility]                    

 

                    ELECTROLYTES                            eGFR                            46 mL/min/1.73m2

                                                         10/08/2015                  

                                                      Result Comment: The eGFR is calculated using the

 CKD-EPI formula. In most young, healthy individuals the eGFR will be >90 
mL/min/1.73m2. The eGFR declines with age. An eGFR of 60-89 may be normal in 
some populations, particularly the elderly, for whom the CKD-EPI formula has not
 been extensively validated. Use of the eGFR is not recommended in the following
 populations:



Individuals with unstable creatinine concentrations, including pregnant patients
 and those with serious co-morbid conditions.



Patients with extremes in muscle mass or diet. 



The data above are obtained from the National Kidney Disease Education Program 
(NKDEP) which additionally recommends that when the eGFR is used in patients 
with extremes of body mass index for purposes of drug dosing, the eGFR should be
 multiplied by the estimated BMI.                            [facility]

                    

 

                    ELECTROLYTES                            Calcium Lvl                            8.8 mg/dL

                             8.5 - 10.5                            10/08/2015        

                                                                            [facility]                    

 

                    ELECTROLYTES                            CO2                            31 meq/L     

                          24 - 32                            10/08/2015                

                                                                            [facility]

                    

 

                    ELECTROLYTES                            Chloride Lvl                            104 

meq/L                             95 - 109                            10/08/2015     

                                                                               [facility]                    

 

                    ELECTROLYTES                            Glucose Lvl                            130 mg/dL

                             70 - 99                            10/08/2015           

                                                                            [facility]                    

 

                    ELECTROLYTES                            Sodium Lvl                            143 meq/L

                             135 - 145                            10/08/2015         

                                                                            [facility]                    

 

                    ELECTROLYTES                            Potassium Lvl                            3.7

 meq/L                             3.5 - 5.1                            10/08/2015   

                                                                                 [facility]                    

 

                    ELECTROLYTES                            BUN                            14 mg/dL     

                          7 - 22                            10/08/2015                 

                                                                            [facility]

                    

 

                    ELECTROLYTES                            Creatinine Lvl                            1.1

 mg/dL                             0.5 - 1.4                            10/08/2015   

                                                                                 [facility]                    

 

                    HEMATOLOGY                            Monocytes #                            0.5 K/CMM

                             0.0 - 0.8                            10/08/2015         

                                                                            [facility]                    

 

                    HEMATOLOGY                            Segs-Bands #                            13.7 K/CMM

                             1.5 - 8.1                            10/08/2015         

                                                                            [facility]                    

 

                    HEMATOLOGY                            Lymphocytes #                            0.2 K/CMM

                             1.0 - 5.5                            10/08/2015         

                                                                            [facility]                    

 

                    HEMATOLOGY                            Eosinophils                            0.6 %  

                           0.0 - 4.0                            10/08/2015           

                                                                            [facility]                    

 

                    HEMATOLOGY                            Basophils                            0.2 %    

                          0.0 - 1.0                            10/08/2015             

                                                                            [facility]                    

 

                    HEMATOLOGY                            Lymphocytes                            1.4 %  

                           20.0 - 40.0                            10/08/2015         

                                                                            [facility]                    

 

                    HEMATOLOGY                            Monocytes                            3.3 %    

                          2.0 - 12.0                            10/08/2015            

                                                                            [facility]                    

 

                    HEMATOLOGY                            Segs                            94.5 %        

                          45.0 - 75.0                            10/08/2015               

                                                                            [facility]

                    

 

                    HEMATOLOGY                            Eosinophils #                            0.1 K/CMM

                             0.0 - 0.5                            10/08/2015         

                                                                            [facility]                    

 

                    HEMATOLOGY                            Platelet                            200 K/CMM 

                            133 - 450                            10/08/2015          

                                                                            [facility]                    

 

                    HEMATOLOGY                            MCHC                            31.5 g/dL     

                          32.0 - 36.0                            10/08/2015            

                                                                            [facility]                    

 

                    HEMATOLOGY                            RDW                            15.4 %         

                          11.5 - 14.5                            10/08/2015                

                                                                            [facility]

                    

 

                    HEMATOLOGY                            MCV                            80.8 fL        

                          80.0 - 98.0                            10/08/2015               

                                                                            [facility]

                    

 

                    HEMATOLOGY                            MCH                            25.4 pg        

                          27.0 - 31.0                            10/08/2015               

                                                                            [facility]

                    

 

                    HEMATOLOGY                            Hct                            42.2 %         

                          36.0 - 48.0                            10/08/2015                

                                                                            [facility]

                    

 

                    HEMATOLOGY                            RBC                            5.22 M/CMM     

                          4.20 - 5.40                            10/08/2015            

                                                                            [facility]                    

 

                    HEMATOLOGY                            Hgb                            13.3 g/dL      

                          12.0 - 16.0                            10/08/2015             

                                                                            [facility]                    

 

                    HEMATOLOGY                            WBC                            14.5 K/CMM     

                          3.7 - 10.4                            10/08/2015             

                                                                            [facility]                    

 

                    HEMATOLOGY                            MPV                            9.3 fL         

                          7.4 - 10.4                            10/08/2015                 

                                                                            [facility]

                    

 

                    PARATHYROID PROFILE                            Ca Ion WB                            

1.08 mMol/L                             1.05 - 1.25                            10/08/2015

                                                                                    [facility]                    

 

                          PARATHYROID PROFILE                            Ca Norm WB                         

                    1.06 mMol/L                             1.05 - 1.25                            10/08/2015

                                                                                    [facility]                    

 

                    Chest 1view DX                            Chest 1view DX                            

Portable ap semierect chest 2015

 

HISTORY: Abnormal chest sounds. Comparison is made with yesterday.

 

FINDINGS: Cardiomediastinal silhouette and life support lines are stable. 
Costophrenic sulci are sharp. The lungs are clear.

 

CONCLUSION:  No significant interval change in the appearance of the chest when 
compared to prior radiograph.

                                                          10/08/2015                 

                                                      -

                                        -





Read by:  Eden Kong MD

Dictated Date/time:  10/08/15 11:01

Electronically Signed by:  Eden Kong MD               10/08/15 
11:28

FINAL REPORT

                                        [facility]                    

 

                          PARATHYROID PROFILE                            Ca Norm WB                         

                    1.04 mMol/L                             1.05 - 1.25                            10/08/2015

                                                                                    [facility]                    

 

                    PARATHYROID PROFILE                            Ca Ion WB                            

1.06 mMol/L                             1.05 - 1.25                            10/08/2015

                                                                                    [facility]                    

 

                    CHEM PANEL                            Phosphorus                            3.5 mg/dL

                             2.5 - 4.5                            10/08/2015         

                                                                            [facility]                    

 

                    CHEM PANEL                            Magnesium Lvl                            1.6 mg/dL

                             1.8 - 2.4                            10/08/2015         

                                                                            [facility]                    

 

                    ELECTROLYTES                            AGAP                            11.4 meq/L  

                           10.0 - 20.0                            10/08/2015         

                                                                            [facility]                    

 

                    ELECTROLYTES                            eGFR                            54 mL/min/1.73m2

                                                         10/08/2015                  

                                                      Result Comment: The eGFR is calculated using the

 CKD-EPI formula. In most young, healthy individuals the eGFR will be >90 
mL/min/1.73m2. The eGFR declines with age. An eGFR of 60-89 may be normal in 
some populations, particularly the elderly, for whom the CKD-EPI formula has not
 been extensively validated. Use of the eGFR is not recommended in the following
 populations:



Individuals with unstable creatinine concentrations, including pregnant patients
 and those with serious co-morbid conditions.



Patients with extremes in muscle mass or diet. 



The data above are obtained from the National Kid[employer] Education Program 
(NKDEP) which additionally recommends that when the eGFR is used in patients 
with extremes of body mass index for purposes of drug dosing, the eGFR should be
 multiplied by the estimated BMI.                            [facility]

                    

 

                    ELECTROLYTES                            Calcium Lvl                            9.1 mg/dL

                             8.5 - 10.5                            10/08/2015        

                                                                            [facility]                    

 

                    ELECTROLYTES                            Chloride Lvl                            102 

meq/L                             95 - 109                            10/08/2015     

                                                                               [facility]                    

 

                    ELECTROLYTES                            CO2                            30 meq/L     

                          24 - 32                            10/08/2015                

                                                                            [facility]

                    

 

                    ELECTROLYTES                            Glucose Lvl                            141 mg/dL

                             70 - 99                            10/08/2015           

                                                                            [facility]                    

 

                    ELECTROLYTES                            Potassium Lvl                            3.4

 meq/L                             3.5 - 5.1                            10/08/2015   

                                                                                 [facility]                    

 

                    ELECTROLYTES                            Creatinine Lvl                            1.0

 mg/dL                             0.5 - 1.4                            10/08/2015   

                                                                                 [facility]                    

 

                    ELECTROLYTES                            BUN                            12 mg/dL     

                          7 - 22                            10/08/2015                 

                                                                            [facility]

                    

 

                    ELECTROLYTES                            Sodium Lvl                            140 meq/L

                             135 - 145                            10/08/2015         

                                                                            [facility]                    

 

                    HEMATOLOGY                            Lymphocytes                            0.7 %  

                           20.0 - 40.0                            10/08/2015         

                                                                            [facility]                    

 

                    HEMATOLOGY                            Monocytes #                            0.5 K/CMM

                             0.0 - 0.8                            10/08/2015         

                                                                            [facility]                    

 

                    HEMATOLOGY                            Basophils                            0.4 %    

                          0.0 - 1.0                            10/08/2015             

                                                                            [facility]                    

 

                    HEMATOLOGY                            Eosinophils                            0.1 %  

                           0.0 - 4.0                            10/08/2015           

                                                                            [facility]                    

 

                    HEMATOLOGY                            Segs                            96.2 %        

                          45.0 - 75.0                            10/08/2015               

                                                                            [facility]

                    

 

                    HEMATOLOGY                            Monocytes                            2.6 %    

                          2.0 - 12.0                            10/08/2015            

                                                                            [facility]                    

 

                    HEMATOLOGY                            Lymphocytes #                            0.1 K/CMM

                             1.0 - 5.5                            10/08/2015         

                                                                            [facility]                    

 

                    HEMATOLOGY                            Segs-Bands #                            17.5 K/CMM

                             1.5 - 8.1                            10/08/2015         

                                                                            [facility]                    

 

                    HEMATOLOGY                            Basophils #                            0.1 K/CMM

                             0.0 - 0.2                            10/08/2015         

                                                                            [facility]                    

 

                    HEMATOLOGY                            MPV                            9.3 fL         

                          7.4 - 10.4                            10/08/2015                 

                                                                            [facility]

                    

 

                    HEMATOLOGY                            RBC                            5.33 M/CMM     

                          4.20 - 5.40                            10/08/2015            

                                                                            [facility]                    

 

                    HEMATOLOGY                            WBC                            18.2 K/CMM     

                          3.7 - 10.4                            10/08/2015             

                                                                            [facility]                    

 

                    HEMATOLOGY                            MCH                            25.7 pg        

                          27.0 - 31.0                            10/08/2015               

                                                                            [facility]

                    

 

                    HEMATOLOGY                            RDW                            15.2 %         

                          11.5 - 14.5                            10/08/2015                

                                                                            [facility]

                    

 

                    HEMATOLOGY                            MCHC                            31.9 g/dL     

                          32.0 - 36.0                            10/08/2015            

                                                      Result Comment: rechecked.               

                                        [facility]                    

 

                    HEMATOLOGY                            Hgb                            13.7 g/dL      

                          12.0 - 16.0                            10/08/2015             

                                                                            [facility]                    

 

                    HEMATOLOGY                            MCV                            80.4 fL        

                          80.0 - 98.0                            10/08/2015               

                                                                            [facility]

                    

 

                    HEMATOLOGY                            Hct                            42.8 %         

                          36.0 - 48.0                            10/08/2015                

                                                                            [facility]

                    

 

                    HEMATOLOGY                            Platelet                            219 K/CMM 

                            133 - 450                            10/08/2015          

                                                                            [facility]                    

 

                    URINE AND STOOL                            UA Spec Grav                            >=1.050

 

*ABN*

                    (10/7/15 8:34 PM)                              <=1.030                            10/08/2015

                                                                                    [facility]                    

 

                    URINE AND STOOL                            Micro?                            Performed

 

*NA*

                    (10/7/15 8:34 PM)                                                          10/08/2015

                                                                                    [facility]                    

 

                    URINE AND STOOL                            UA Bud Yeast                            Occasional

 /HPF                              None Seen /HPF                            10/08/2015

                                                                                    [facility]                    

 

                    URINE AND STOOL                            UA Bacteria                            Few

 /HPF                              None Seen /HPF                            10/08/2015

                                                                                    [facility]                    

 

                    URINE AND STOOL                            UA Mucus                            Few /LPF

                              None Seen /LPF                            10/08/2015   

                                                                                 [facility]                    

 

                    URINE AND STOOL                            UA Sq Epi                            Occasional

 /LPF                              Few /LPF                            10/08/2015    

                                                                                [facility]                    

 

                    URINE AND STOOL                            UA RBC                            9 /HPF 

                            0 - 2                            10/08/2015              

                                                                            [facility]                    

 

                    URINE AND STOOL                            UA WBC                            5 /HPF 

                            0 - 5                            10/08/2015              

                                                                            [facility]                    

 

                    URINE AND STOOL                            UA Leuk Est                            Negative

 

                    (10/7/15 8:34 PM)                              Negative                            10/08/2015

                                                                                    [facility]                    

 

                    URINE AND STOOL                            UA Nitrite                            Negative

 

                    (10/7/15 8:34 PM)                              Negative                            10/08/2015

                                                                                    [facility]                    

 

                    URINE AND STOOL                            UA Bili                            Negative

 

*NA*

                    (10/7/15 8:34 PM)                              Negative                            10/08/2015

                                                                                    [facility]                    

 

                    URINE AND STOOL                            UA Blood                            Trace

 

*ABN*

                    (10/7/15 8:34 PM)                              Negative                            10/08/2015

                                                                                    [facility]                    

 

                          URINE AND STOOL                            UA Urobilinogen                        

                    2.0 mg/dL                             0.1 - 1.0                            10/08/2015

                                                                                    [facility]                    

 

                    URINE AND STOOL                            UA Protein                            100

 mg/dL                              Negative mg/dL                            10/08/2015

                                                                                    [facility]                    

 

                    URINE AND STOOL                            UA Glucose                            Negative

 mg/dL                              Negative mg/dL                            10/08/2015

                                                                                    [facility]                    

 

                    URINE AND STOOL                            UA Ketones                            10 

mg/dL                              Negative mg/dL                            10/08/2015

                                                                                    [facility]                    

 

                    URINE AND STOOL                            UA Color                            Yellow

 

*NA*

                    (10/7/15 8:34 PM)                              Yellow                            10/08/2015

                                                                                    [facility]                    

 

                    URINE AND STOOL                            UA Turbidity                            Clear

 

                    (10/7/15 8:34 PM)                              Clear                            10/08/2015

                                                                                    [facility]                    

 

                    URINE AND STOOL                            UA pH                            6.5     

                          5.0 - 8.0                            10/08/2015             

                                                                            [facility]                    

 

                    HEMATOLOGY                            PTT                            32.0 s         

                          22.9 - 35.8                            10/07/2015                

                                                                            [facility]

                    

 

                    HEMATOLOGY                            INR                            1.59           

                          0.85 - 1.17                            10/07/2015                 

                                                                            [facility]

                    

 

                    HEMATOLOGY                            PT                            19.3 s          

                          12.0 - 14.7                            10/07/2015                 

                                                                            [facility]

                    

 

                    Clavicle DX                            Clavicle DX                            EXAM: 

XR RIGHT CLAVICLE 2 VIEWS

 

DATE: 10/7/2015 at 1510 hours

 

INDICATION: Fracture

 

COMPARISON: CT chest of 10/7/2015 at 1012 hours.

 

TECHNIQUE: AP and axial views of the right clavicle

 

FINDINGS: There is a comminuted fracture of the distal third of the right 
clavicle, with superior displacement of the lateral fragment. The appearance is 
not significantly changed from the recent CT. There is overlying soft tissue 
swelling.

 

 Sternoclavicular joint remains overall aligned.

 

IMPRESSION: Acute, comminuted fracture of the distal third of the right 
clavicle.

                                                          10/07/2015                 

                                                      -

                                        -





Read by:  Arabella Marlow MD

Dictated Date/time:  10/07/15 16:04

Electronically Signed by:  Arabella Marlow MD                     10/07/15 
16:06

FINAL REPORT

                                        [facility]                    

 

                          Forearm 2 views DX                            Forearm 2 views DX                  

                                        EXAM: XR RIGHT HAND 3 VIEWS

EXAM: XR RIGHT FOREARM 2 VIEWS

EXAM: XR RIGHT ELBOW 3 VIEWS

 

DATE: 10/7/2015 at 1503 hours

 

INDICATION: Fracture, postreduction

 

COMPARISON: 10/7/2015

 

TECHNIQUE:   PA, lateral and oblique radiographs of the right hand, AP and 
lateral radiographs of the right forearm, AP, lateral and oblique radiographs of
 the right elbow

 

FINDINGS: Overlying cast material obscures bony detail. A comminuted fracture of
 the middle finger metacarpal is again identified, with minimal apex dorsal 
angulation in cast. Generalized osteopenia is present.

 

No additional acute bony abnormality is identified at the forearm or the elbow. 
There is no elbow joint effusion.

 

IMPRESSION: 

                                        1. Comminuted fracture of the middle finger metacarpal, with minimal apex dorsal

 displacement unchanged following casting.

                                        2. No additional acute bony abnormality identified at the right forearm or right

 elbow.

                                                          10/07/2015                 

                                                      -

                                        -





Read by:  Arabella Marlow MD

Dictated Date/time:  10/07/15 16:49

Electronically Signed by:  Arabella Marlow MD                     10/07/15 
16:51

FINAL REPORT

                                        [facility]                    

 

                          Elbow 2 views DX                            Elbow 2 views DX                      

                                        EXAM: XR RIGHT HAND 3 VIEWS

EXAM: XR RIGHT FOREARM 2 VIEWS

EXAM: XR RIGHT ELBOW 3 VIEWS

 

DATE: 10/7/2015 at 1503 hours

 

INDICATION: Fracture, postreduction

 

COMPARISON: 10/7/2015

 

TECHNIQUE:   PA, lateral and oblique radiographs of the right hand, AP and 
lateral radiographs of the right forearm, AP, lateral and oblique radiographs of
 the right elbow

 

FINDINGS: Overlying cast material obscures bony detail. A comminuted fracture of
 the middle finger metacarpal is again identified, with minimal apex dorsal 
angulation in cast. Generalized osteopenia is present.

 

No additional acute bony abnormality is identified at the forearm or the elbow. 
There is no elbow joint effusion.

 

IMPRESSION: 

                                        1. Comminuted fracture of the middle finger metacarpal, with minimal apex dorsal

 displacement unchanged following casting.

                                        2. No additional acute bony abnormality identified at the right forearm or right

 elbow.

                                                          10/07/2015                 

                                                      -

                                        -





Read by:  Arabella Marlow MD

Dictated Date/time:  10/07/15 16:49

Electronically Signed by:  Arabella Marlow MD                     10/07/15 
16:51

FINAL REPORT

                                        [facility]                    

 

                          Hand 3 views DX                            Hand 3 views DX                        

                                        EXAM: XR RIGHT HAND 3 VIEWS

EXAM: XR RIGHT FOREARM 2 VIEWS

EXAM: XR RIGHT ELBOW 3 VIEWS

 

DATE: 10/7/2015 at 1503 hours

 

INDICATION: Fracture, postreduction

 

COMPARISON: 10/7/2015

 

TECHNIQUE:   PA, lateral and oblique radiographs of the right hand, AP and 
lateral radiographs of the right forearm, AP, lateral and oblique radiographs of
 the right elbow

 

FINDINGS: Overlying cast material obscures bony detail. A comminuted fracture of
 the middle finger metacarpal is again identified, with minimal apex dorsal 
angulation in cast. Generalized osteopenia is present.

 

No additional acute bony abnormality is identified at the forearm or the elbow. 
There is no elbow joint effusion.

 

IMPRESSION: 

                                        1. Comminuted fracture of the middle finger metacarpal, with minimal apex dorsal

 displacement unchanged following casting.

                                        2. No additional acute bony abnormality identified at the right forearm or right

 elbow.

                                                          10/07/2015                 

                                                      -

                                        -





Read by:  Arabella Marlow MD

Dictated Date/time:  10/07/15 16:49

Electronically Signed by:  Arabella Marlow MD                     10/07/15 
16:51

FINAL REPORT

                                        [facility]                    

 

                    HEMATOLOGY                            Angle                            79 degrees   

                          64 - 80                            10/07/2015              

                                                                            [facility]                    

 

                    HEMATOLOGY                            [place]                            0.7 min

                                                         10/07/2015                  

                                                                            [facility]

                    

 

                    HEMATOLOGY                            K-time                            0.8 min     

                          0.6 - 2.3                            10/07/2015              

                                                                            [facility]                    

 

                    HEMATOLOGY                            R-time                            0.8 min     

                          0.4 - 0.7                            10/07/2015              

                                                                            [facility]                    

 

                          HEMATOLOGY                            Rapid TEG Sample Type                       

                                        Citrated Whole Blood 

                    (10/7/15 9:53 AM)                                                          10/07/2015

                                                                                    [facility]                    

 

                    HEMATOLOGY                            ACT (TEG)                            121 s    

                          86 - 118                            10/07/2015              

                                                                            [facility]                    

 

                    HEMATOLOGY                            Max Amp                            78 mm      

                          52 - 71                            10/07/2015                 

                                                                            [facility]

                    

 

                    HEMATOLOGY                            G-value                            17.5 K d/sc

                             5.0 - 11.6                            10/07/2015        

                                                                            [facility]                    

 

                    HEMATOLOGY                            Estimated % Lysis                            3.4

 %                             0.0 - 7.5                            10/07/2015       

                                                      Result Comment: "Significant Findings

 called to mendy leblanc_at 10/07/2015 10:59__bypm ___.Read Back OK."           
                                        [facility]                    

 

                          Hand 3 views DX                            Hand 3 views DX                        

                                        EXAM: XR RIGHT HAND 3 VIEWS

 

DATE: Oct 07, 2015 01:53:00 PM

 

INDICATION: Fracture followup.

 

COMPARISON: Right hand images from outside institution performed earlier today. 

 

DISCUSSION:  PA, lateral and oblique radiographs of the right hand are submitted
 for interpretation.  The comminuted mid diaphyseal fracture is again noted in 
the third metacarpal shaft. The dominant distal fragment is displaced 
posteromedially by one cortical width but not significantly angulated. No other 
acute bony or articular abnormalities identified. Multifocal osteoarthrosis is 
present, however, most pronounced at the index finger distal interphalangeal 
joint.

 

IMPRESSION: Unchanged, mildly displaced, comminuted third metacarpal shaft 
fracture.

 

 

                                                          10/07/2015                 

                                                      -

                                        -





Read by:  Charanjit Winslow MD

Dictated Date/time:  10/07/15 14:12

Electronically Signed by:  Charanjit Winslow MD                   10/07/15 
14:14

FINAL REPORT

                                        [facility]                    

 

                          Chest/Abdomen/Pelvis w IV contrast CT                            Chest/Abdomen/Pelvis

 w IV contrast CT                            EXAM: CT CHEST WITH CONTRAST

EXAM: CT ABDOMEN AND PELVIS WITH CONTRAST

 

TECHNIQUE: Volumetric acquisition of the chest, abdomen and pelvis following 
intravenous administration of contrast. Delayed imaging was then performed 
through the kidneys and bladder, using a radiation reduction technique.  Axial, 
coronal and sagittal reformats are provided.  

 

DATE:  2015 at 1012 hours.

 

INDICATION: Pain post trauma

 

COMPARISON: None

 

FINDINGS:

 

Lines and Tubes: None.

 

Lower Neck: Visible portions unremarkable. 

 

Thoracic Aorta and Mediastinum: No mediastinal hematoma or thoracic aortic 
injury.  

 

Lungs and Pleura: Bibasilar subsegmental atelectasis is present.  

 

Hepatobiliary: There is mild intrahepatic biliary dilation.

 

Gallbladder: Surgically absent.

 

Spleen: Normal.

 

Pancreas: Normal.

 

Adrenals: Normal.

 

Kidneys: A renal cyst and nonobstructing renal stone is seen on the left.

 

Ureters and Bladder: No injury.

 

Reproductive Organs: No injury.

 

Gastrointestinal Tract: No injury  

 

Peritoneum and Retroperitoneum: No fluid collections or free air. 

 

Abdominal/Pelvic Vasculature: Dense calcifications are seen in the abdominal 
aorta and its branches.  

 

Lymphadenopathy: None. 

 

Spine/Bones:  A right distal clavicle fracture is present. There are fractures 
seen in the right posterior ribs 1 through 3, right anterior rib 4, and left 
posterior ribs 1, 2 and 5. There is a suspected compression fracture versus 
artifact of the T4 vertebral body, anterosuperior corner. Kyphosis and severe 
degenerative changes are seen of the entire spine. Total hip replacement is seen
 on the right with no evidence of hardware failure. Intramedullary jabari fixation 
of a left intertrochanteric femur fracture is present with fracture line still 
evident and signs of healing.

 

Soft Tissues: Unremarkable. 

 

IMPRESSION:  

                                        1. Right distal clavicle fracture.

                                        2. Fracture of right posterior ribs 1 - 3, right anterior rib 4, and left posterior

 ribs 1, 2 and 5.

                                        3. Suspected fracture versus artifact of the T4 vertebral body, please correlate

 for point tenderness.

                                                          10/07/2015                 

                                                      -

                                        -

This report was dictated by a Radiology Resident/Fellow.  I have personally 
reviewed the images as

well as the Resident's interpretation and agree with the findings.

Read by:  Magi Valencia MD        Resident:  Magi Valencia MD

Dictated Date/time:  10/07/15 11:43

Electronically Signed by:  Charanjit Winslow MD                   10/07/15 
12:30

FINAL REPORT

                                        [facility]                    

 

                          Brain wo contrast CT                            Brain wo contrast CT              

                                        EXAM: CT BRAIN WITHOUT CONTRAST.

 

DATE: Oct 07, 2015 10:15:00 AM

 

INDICATION: Pain Post Trauma

 

COMPARISON: None available.

 

TECHNIQUE: Contiguous axial images of the brain were obtained from the skull 
base to the vertex without IV contrast.

 

FINDINGS:

 

Areas of encephalomalacia in the occipital lobes and in the left middle and 
inferior frontal gyrus are identified. No signs of acute cortical infarct or 
parenchymal hemorrhage are demonstrated. The ventricles and sulci are prominent 
as a result of volume loss. There are atherosclerotic changes at the carotid 
siphons. The sinuses, orbits and mastoids are unremarkable.

 

IMPRESSION:

 

                                        1. No acute traumatic injury.

                                        2. Areas of encephalomalacia in the left frontal lobe and both occipital lobes.

                                                          10/07/2015                 

                                                      -

                                        -





Read by:  Socorro Aquino

Dictated Date/time:  10/07/15 11:52

Electronically Signed by:  Socorro Aquino              10/07/15 
11:56

FINAL REPORT

                                        [facility]                    

 

                    Chest 1view DX                            Chest 1view DX                            

EXAM: XR CHEST 1 VIEW 

 

DATE:  2015 at 0941 hours

 

INDICATION: Pain Post Trauma

 

COMPARISON: None

 

TECHNIQUE: Single AP view of the chest

 

DISCUSSION: There are streaky opacities seen in the right upper lobe. An AICD is
 seen with leads intact. There is a probable small apical pneumothorax on the 
right, though this could be due to artifact. There is a fracture of the 
posterior ribs 1 and probable 2 as it is very difficult to visualize and may 
also be artifact. There is a distal clavicle fracture on the right. The aortic 
knob is calcified.

 

IMPRESSION: 

                                        1. Streaky opacities in the right upper lobe consistent with atelectasis.

                                        2. Distal clavicle fracture on the right.

                                        3. Fracture posterior rib 1 and probably rib 2.

                                        4. Probable small apical pneumothorax on the right.

 

                                                          10/07/2015                 

                                                      -

                                        -

This report was dictated by a Radiology Resident/Fellow.  I have personally 
reviewed the images as

well as the Resident's interpretation and agree with the findings.

Read by:  Magi Valencia MD        Resident:  Magi Valencia MD

Dictated Date/time:  10/07/15 11:32

Electronically Signed by:  Charanjit Winslow MD                   10/07/15 
12:28

FINAL REPORT

                                        [facility]                    



                                                                                
                                                                                
                                                                                
                                                                                
                                                                                
                                                                                
                                                                                
                                                                                
                                                                                
                                                                                
                                                                                
                                                                                
                                                                                
                                                                                
                                                                                
                                                                                
                                                                                
                                                                                
                                                                                
                                                                                
                                                                                
                                                                                
                                                                                
                                                                                
                                                                                
                                                                                
                                                                                
                                                                                
                                                                                
                                                                                
                                                                                
                                                                                
                                                                                
                                                                                
                                                                                
                                                                                
                                                                                
                                                                                
                                                                                
                                                                                
                                                                                
                                                                                
                                                                                
                                                                                
                                                                                
                                                                                
                                                                                
                                                                                
                                                                                
                                                                                
                                



Vital Signs

                    





                    Vital Sign                            Value                            Date         

                          Comments                            Source                    

 

                    Weight                            54.545                             2016     

                                                      [facility]           

         

 

                    BMI Calculated                            20.01                             2016

                                                        [facility]      

              

 

                    Height                            165.1 cm                            2016    

                                                      [facility]          

          

 

                    Systolic (mm Hg)                            115                             03/10/2016

                                                        [facility]      

              

 

                    Diastolic (mm Hg)                            52                             03/10/2016

                                                        [facility]      

              

 

                    Systolic (mm Hg)                            91                             03/10/2016

                                                        [facility]      

              

 

                    Diastolic (mm Hg)                            50                             03/10/2016

                                                        [facility]      

              

 

                    Respitory Rate                            28                             03/10/2016 

                                                       [facility]       

             

 

                    Temperature Oral (F)                            97.7 F                            03/10/2016

                                                        [facility]      

              

 

                    Respitory Rate                            29                             03/10/2016 

                                                       [facility]       

             

 

                    Height                            165.1 cm                            03/10/2016    

                                                      [facility]          

          

 

                    Weight                            58.636                             03/10/2016     

                                                      [facility]           

         

 

                    BMI Calculated                            21.51                             03/10/2016

                                                        [facility]      

              

 

                    Temperature Oral (F)                            97.4 F                            03/10/2016

                                                        [facility]      

              

 

                    Respitory Rate                            18                             03/10/2016 

                                                       [facility]       

             

 

                    Systolic (mm Hg)                            103                             03/10/2016

                                                        [facility]      

              

 

                    Diastolic (mm Hg)                            54                             03/10/2016

                                                        [facility]      

              

 

                    Temperature Oral (F)                            98.9 F                            03/10/2016

                                                        [facility]      

              

 

                    Height                            165.1 cm                            2016    

                                                      [facility]          

          

 

                    BMI Calculated                            21.51                             2016

                                                        [facility]      

              

 

                    Weight                            58.636                             2016     

                                                      [facility]           

         

 

                    Temperature Oral (F)                            98.2 F                            10/12/2015

                                                        [facility]      

              

 

                    Systolic (mm Hg)                            119                             10/12/2015

                                                        [facility]      

              

 

                    Diastolic (mm Hg)                            68                             10/12/2015

                                                        [facility]      

              

 

                    Respitory Rate                            20                             10/12/2015 

                                                       [facility]       

             

 

                    Respitory Rate                            20                             10/12/2015 

                                                       [facility]       

             

 

                    Systolic (mm Hg)                            130                             10/12/2015

                                                        [facility]      

              

 

                    Diastolic (mm Hg)                            75                             10/12/2015

                                                        [facility]      

              

 

                    Temperature Oral (F)                            97.8 F                            10/12/2015

                                                        [facility]      

              

 

                    Heart Rate                            70                             10/12/2015     

                                                      [facility]           

         

 

                    Systolic (mm Hg)                            139                             10/12/2015

                                                        [facility]      

              

 

                    Diastolic (mm Hg)                            84                             10/12/2015

                                                        [facility]      

              

 

                    Respitory Rate                            20                             10/12/2015 

                                                       [facility]       

             

 

                    Heart Rate                            70                             10/12/2015     

                                                      [facility]           

         

 

                    Temperature Oral (F)                            97.6 F                            10/12/2015

                                                        [facility]      

              

 

                    Heart Rate                            70                             10/12/2015     

                                                      [facility]           

         

 

                    Height                            152 cm                            10/10/2015      

                                                      [facility]            

        

 

                    BMI Calculated                            18.59                             10/07/2015

                                                        [facility]      

              

 

                    Height                            152.4 cm                            10/07/2015    

                                                      [facility]          

          

 

                    Weight                            43.182                             10/07/2015     

                                                      [facility]           

         

 

                    BMI Calculated                            17.61                             10/07/2015

                                                        [facility]      

              

 

                    Weight                            40.909                             10/07/2015     

                                                      [facility]           

         

 

                    Height                            152.4 cm                            10/07/2015    

                                                      [facility]          

          

 

                    Weight                            60                             10/07/2015         

                                                      [facility]               

     



                                                                                
                                                                                
                                                                                
                                                                                
                                                                                
                                                                                
                                                                                
                                                                                
                                                                                
                                                        



Encounters

                    





                    Location                            Location Details                            Encounter

 Type                            Encounter Number                            Reason For

 Visit                            Attending Provider                            ADM Date

                            DC Date                            Status                

                                        Source                    

 

                    [facility]                                                        Inpatient

                            [id number]                                             

                          Zhao Alarcon                             10/07/2015                 

                    10/12/2015                                                        [facility]                            [id number]                     

                                                Lalo Nagy                             10/19/2015

                            10/20/2015                                               

                                         [facility]                                                        Inpatient

                            [id number]                                             

                          Marguerite Muñoz                             2016                      

                    03/10/2016                                                        [facility]                                                        Outpatient

                            [id number]                                             

                          Marguerite Muñoz                             2016                                                        [facility]                    



                                                                                
                                        



Procedures

                    





                    Procedure                            Code                            Date           

                          Perfomer                            Comments                        

                                        Source

## 2019-09-30 ENCOUNTER — HOSPITAL ENCOUNTER (EMERGENCY)
Dept: HOSPITAL 88 - ER | Age: 84
LOS: 1 days | Discharge: NURSING, REHAB OR LONG-TERM CARE | End: 2019-10-01
Payer: MEDICARE

## 2019-09-30 VITALS — BODY MASS INDEX: 22.66 KG/M2 | HEIGHT: 65 IN | WEIGHT: 136 LBS

## 2019-09-30 DIAGNOSIS — G20: ICD-10-CM

## 2019-09-30 DIAGNOSIS — J44.9: ICD-10-CM

## 2019-09-30 DIAGNOSIS — I50.9: ICD-10-CM

## 2019-09-30 DIAGNOSIS — F02.80: ICD-10-CM

## 2019-09-30 DIAGNOSIS — I10: ICD-10-CM

## 2019-09-30 DIAGNOSIS — E11.9: ICD-10-CM

## 2019-09-30 DIAGNOSIS — S39.012A: ICD-10-CM

## 2019-09-30 DIAGNOSIS — Z95.0: ICD-10-CM

## 2019-09-30 DIAGNOSIS — R60.9: ICD-10-CM

## 2019-09-30 DIAGNOSIS — M25.552: Primary | ICD-10-CM

## 2019-09-30 LAB
ALBUMIN SERPL-MCNC: 2.3 G/DL (ref 3.5–5)
ALBUMIN/GLOB SERPL: 0.6 {RATIO} (ref 0.8–2)
ALP SERPL-CCNC: 79 IU/L (ref 40–150)
ALT SERPL-CCNC: < 6 IU/L (ref 0–55)
ANION GAP SERPL CALC-SCNC: 12.7 MMOL/L (ref 8–16)
BACTERIA URNS QL MICRO: (no result) /HPF
BASOPHILS # BLD AUTO: 0.1 10*3/UL (ref 0–0.1)
BASOPHILS NFR BLD AUTO: 0.6 % (ref 0–1)
BILIRUB UR QL: NEGATIVE
BUN SERPL-MCNC: 19 MG/DL (ref 7–26)
BUN/CREAT SERPL: 33 (ref 6–25)
CALCIUM SERPL-MCNC: 8.8 MG/DL (ref 8.4–10.2)
CHLORIDE SERPL-SCNC: 104 MMOL/L (ref 98–107)
CLARITY UR: (no result)
CO2 SERPL-SCNC: 21 MMOL/L (ref 22–29)
COLOR UR: YELLOW
DEPRECATED NEUTROPHILS # BLD AUTO: 5.6 10*3/UL (ref 2.1–6.9)
DEPRECATED RBC URNS MANUAL-ACNC: (no result) /HPF (ref 0–5)
EGFRCR SERPLBLD CKD-EPI 2021: > 60 ML/MIN (ref 60–?)
EOSINOPHIL # BLD AUTO: 0.4 10*3/UL (ref 0–0.4)
EOSINOPHIL NFR BLD AUTO: 5.3 % (ref 0–6)
EPI CELLS URNS QL MICRO: (no result) /LPF
ERYTHROCYTE [DISTWIDTH] IN CORD BLOOD: 15.5 % (ref 11.7–14.4)
GLOBULIN PLAS-MCNC: 3.7 G/DL (ref 2.3–3.5)
GLUCOSE SERPLBLD-MCNC: 91 MG/DL (ref 74–118)
HCT VFR BLD AUTO: 33.8 % (ref 34.2–44.1)
HGB BLD-MCNC: 10.7 G/DL (ref 12–16)
KETONES UR QL STRIP.AUTO: NEGATIVE
LEUKOCYTE ESTERASE UR QL STRIP.AUTO: NEGATIVE
LYMPHOCYTES # BLD: 1 10*3/UL (ref 1–3.2)
LYMPHOCYTES NFR BLD AUTO: 12.3 % (ref 18–39.1)
MCH RBC QN AUTO: 27 PG (ref 28–32)
MCHC RBC AUTO-ENTMCNC: 31.7 G/DL (ref 31–35)
MCV RBC AUTO: 85.1 FL (ref 81–99)
MONOCYTES # BLD AUTO: 1.2 10*3/UL (ref 0.2–0.8)
MONOCYTES NFR BLD AUTO: 14.5 % (ref 4.4–11.3)
NEUTS SEG NFR BLD AUTO: 66.9 % (ref 38.7–80)
NITRITE UR QL STRIP.AUTO: NEGATIVE
PLATELET # BLD AUTO: 299 X10E3/UL (ref 140–360)
POTASSIUM SERPL-SCNC: 3.7 MMOL/L (ref 3.5–5.1)
PROT UR QL STRIP.AUTO: (no result)
RBC # BLD AUTO: 3.97 X10E6/UL (ref 3.6–5.1)
SODIUM SERPL-SCNC: 134 MMOL/L (ref 136–145)
SP GR UR STRIP: 1.02 (ref 1.01–1.02)
UROBILINOGEN UR STRIP-MCNC: 0.2 MG/DL (ref 0.2–1)
WBC #/AREA URNS HPF: (no result) /HPF (ref 0–5)

## 2019-09-30 PROCEDURE — 81001 URINALYSIS AUTO W/SCOPE: CPT

## 2019-09-30 PROCEDURE — 73522 X-RAY EXAM HIPS BI 3-4 VIEWS: CPT

## 2019-09-30 PROCEDURE — 99284 EMERGENCY DEPT VISIT MOD MDM: CPT

## 2019-09-30 PROCEDURE — 85025 COMPLETE CBC W/AUTO DIFF WBC: CPT

## 2019-09-30 PROCEDURE — 36415 COLL VENOUS BLD VENIPUNCTURE: CPT

## 2019-09-30 PROCEDURE — 80053 COMPREHEN METABOLIC PANEL: CPT

## 2019-09-30 PROCEDURE — 96374 THER/PROPH/DIAG INJ IV PUSH: CPT

## 2019-09-30 PROCEDURE — 70450 CT HEAD/BRAIN W/O DYE: CPT

## 2019-09-30 PROCEDURE — 72192 CT PELVIS W/O DYE: CPT

## 2019-09-30 NOTE — DIAGNOSTIC IMAGING REPORT
EXAMINATION: Head CT without contrast.  





HISTORY:Altered mental status.



COMPARISON:Report of CT brain from 10/7/2015, prior images are not available

for comparison at the time of interpretation.



TECHNIQUE: Multidetector axial images were obtained from the foramen magnum to

the vertex without contrast. The images were reconstructed using brain and bone

algorithms.  Thin section brain images were reformatted into coronal and

sagittal planes.

Dose modulation, iterative reconstruction, and/or weight based adjustment of

the mA/kV was utilized to reduce the radiation dose to as low as reasonably

achievable.



Intravenous contrast: None  



IMAGE QUALITY: Suboptimal evaluation due to motion artifacts.



FINDINGS:

    Skull/scalp: No lytic or blastic. lesions.  No surgical changes.

    Parenchyma: 

Suboptimal evaluation due to motion artifacts, despite the limitation no gross

acute hemorrhage or mass. Nonspecific bilateral frontoparietal confluent and

patchy periventricular, subcortical and deep white matter hypodensity are

likely related to small vessel ischemic changes.

Cortical-based hypodensity in bilateral parieto-occipital region and left

middle frontal gyrus represents possible chronic vascular insult as mentioned

in prior report from 10/7/2015..

    Arteries: No density suggestive of thrombosis. Atherosclerotic

calcification in bilateral carotid siphon and V4 segment of the vertebral

arteries.  

    Dural sinuses: No abnormal density suggestive of thrombosis. 

    Ventricles: Moderate compensated dilatation due to volume loss. No acute

hydrocephalus.

    Extra-axial spaces: Suboptimal evaluation due to motion artifacts despite

the limitation no gross abnormality.

    Brain volume: Generalized age-related cerebral volume loss.

    Craniocervical junction: No mass, Chiari malformation, or basilar

invagination.

    Sella: No mass. 

    Paranasal/mastoid sinuses: Imaged portions unremarkable.





IMPRESSION:



1. Suboptimal evaluation due to motion artifacts, despite the limitation no

gross acute intracranial hemorrhage.



2. 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

parieto-occipital region (left more than right) from prior vascular insult.



3. Generalized age-related cerebral volume loss.



Signed by: Dr. Sally Estrada M.D. on 9/30/2019 8:55 PM

## 2019-09-30 NOTE — DIAGNOSTIC IMAGING REPORT
EXAM: CT Pelvis WITHOUT contrast  

INDICATION: Hip pain  

COMPARISON: None.

TECHNIQUE: Pelvis were scanned utilizing a multidetector helical scanner from

the iliac crest to the pubic symphysis without administration of IV contrast.

Coronal and sagittal reformations were obtained. Routine protocol was

performed. 

     IV CONTRAST: None

     ORAL CONTRAST: None

            

COMPLICATIONS: None



RADIATION DOSE:

     Total DLP: 912 mGy*cm

     Estimated effective dose: (DLP x 0.015 x size factor) mSv

     CTDIvol has been reviewed. It is below the limits set by the Radiation

Protocol Committee (RPC).

     Dose modulation, iterative reconstruction, and/or weight based adjustment

of the mA/kV was utilized to reduce the radiation dose to as low as reasonably

achievable. 



FINDINGS:



Evaluation of the pelvis is limited due to metallic streak artifact from the

bilateral femoral and right hip hardware.



LINES and TUBES: None.



GI TRACT:  Marked volume of stool in rectum and mild rectal wall thickening.

Sigmoid diverticuli without evidence of diverticulitis.    



KIDNEYS: Inferior aspects of the kidneys are included, and there is a

nonobstructed 0.3 cm calculus in the minor calyx in the left renal inferior

pole.



PELVIC ORGANS/BLADDER: Uterus absent. Bladder unremarkable no adnexal masses.



LYMPH NODES: No lymphadenopathy.



VESSELS: Vascular calcifications in the abdominal aorta and major branches and

femoral arteries.



PERITONEUM / RETROPERITONEUM: No free air or fluid.



BONES: A curvilinear solid density courses craniocaudally along the posterior

aspect of the right hip arthroplasty hardware (series 8 image 91). 

Degenerative changes in the spine, pelvis, and left hip.  Left femoral jabari and

screw fixation intact, no displaced fractures, although alignment and

evaluation is limited due to suboptimal positioning. Low bone mineral density.



SOFT TISSUES: Increased soft tissue density within the right gluteal

subcutaneous soft tissues overlying the right ischial tuberosity and edema in

the left proximal posterior thigh soft tissues. No fluid collections.          

 



IMPRESSION: 



1.  Marked stool burden in the rectum and moderate colonic stool burden,

concerning for fecal impaction with early findings of stercoral colitis.

2.  Soft tissue edema in the bilateral posterior gluteal, and left proximal

posterior thigh soft tissues, worse on the left, suspect due to chronic

recumbency. No obvious fluid collections.

3.  Suspect displaced right hip arthroplasty cup liner. Right hip arthroplasty

alignment intact. Evaluation of left hip alignment is limited due to suboptimal

positioning. Recommend bilateral hip radiographs if there is concern for hip

fracture or hardware failure.

4.  Distal colonic diverticulosis without obvious diverticulitis.



Signed by: Radu Styles DO on 9/30/2019 9:50 PM

## 2019-10-01 VITALS — SYSTOLIC BLOOD PRESSURE: 164 MMHG | DIASTOLIC BLOOD PRESSURE: 74 MMHG

## 2019-10-01 NOTE — DIAGNOSTIC IMAGING REPORT
HIPS BILAT 3-4VWS (+/- PELVIS) - 3 views



HISTORY:  Pain.    

COMPARISON: Pelvis CT 9/30/2019

     

FINDINGS:

Pelvic osseous structures partially obscured by lower abdominal bowel gas.

Right total hip arthroplasty in normal alignment. Linear hyperdensity along the

distal posterior aspect of the right hip may represent displaced acetabular cup

liner.

Left proximal femoral fixation hardware intact with normal hardware and osseous

alignment, and healed left proximal femoral fracture.



Degenerative changes in the lower lumbar spine, left hip, and pelvis.



Low bone mineral density.



IMPRESSION: 



No radiographic evidence of acute displaced fracture.

Bones and metallic hardware in normal alignment.

Curvilinear hyperdensity on the posteromedial aspect of the right hip

hemiarthroplasty may represent displaced acetabular cup liner.

Low bone mineral density.



Signed by: Radu Styles DO on 10/1/2019 1:35 AM